# Patient Record
Sex: FEMALE | Race: WHITE | NOT HISPANIC OR LATINO | Employment: FULL TIME | URBAN - NONMETROPOLITAN AREA
[De-identification: names, ages, dates, MRNs, and addresses within clinical notes are randomized per-mention and may not be internally consistent; named-entity substitution may affect disease eponyms.]

---

## 2017-10-05 ENCOUNTER — OFFICE VISIT (OUTPATIENT)
Dept: PSYCHIATRY | Facility: CLINIC | Age: 52
End: 2017-10-05

## 2017-10-05 ENCOUNTER — TELEPHONE (OUTPATIENT)
Dept: PSYCHIATRY | Facility: CLINIC | Age: 52
End: 2017-10-05

## 2017-10-05 VITALS
HEART RATE: 103 BPM | DIASTOLIC BLOOD PRESSURE: 86 MMHG | BODY MASS INDEX: 19.97 KG/M2 | HEIGHT: 64 IN | SYSTOLIC BLOOD PRESSURE: 154 MMHG | WEIGHT: 117 LBS

## 2017-10-05 DIAGNOSIS — F33.0 MILD EPISODE OF RECURRENT MAJOR DEPRESSIVE DISORDER (HCC): ICD-10-CM

## 2017-10-05 DIAGNOSIS — F41.1 GENERALIZED ANXIETY DISORDER: Primary | ICD-10-CM

## 2017-10-05 DIAGNOSIS — F43.10 POST TRAUMATIC STRESS DISORDER (PTSD): Chronic | ICD-10-CM

## 2017-10-05 DIAGNOSIS — Z79.899 MEDICATION MANAGEMENT: ICD-10-CM

## 2017-10-05 LAB
AMPHETAMINE CUT-OFF: NORMAL
BENZODIAZIPINE CUT-OFF: NORMAL
BUPRENORPHINE CUT-OFF: NORMAL
COCAINE CUT-OFF: NORMAL
EXTERNAL AMPHETAMINE SCREEN URINE: NEGATIVE
EXTERNAL BENZODIAZEPINE SCREEN URINE: NEGATIVE
EXTERNAL BUPRENORPHINE SCREEN URINE: NEGATIVE
EXTERNAL COCAINE SCREEN URINE: NEGATIVE
EXTERNAL MDMA: NEGATIVE
EXTERNAL METHADONE SCREEN URINE: NEGATIVE
EXTERNAL METHAMPHETAMINE SCREEN URINE: NEGATIVE
EXTERNAL OPIATES SCREEN URINE: NEGATIVE
EXTERNAL OXYCODONE SCREEN URINE: NEGATIVE
EXTERNAL THC SCREEN URINE: NEGATIVE
MDMA CUT-OFF: NORMAL
METHADONE CUT-OFF: NORMAL
METHAMPHETAMINE CUT-OFF: NORMAL
OPIATES CUT-OFF: NORMAL
OXYCODONE CUT-OFF: NORMAL
THC CUT-OFF: NORMAL

## 2017-10-05 PROCEDURE — 90792 PSYCH DIAG EVAL W/MED SRVCS: CPT | Performed by: NURSE PRACTITIONER

## 2017-10-05 RX ORDER — GABAPENTIN 300 MG/1
300 CAPSULE ORAL 2 TIMES DAILY
Status: ON HOLD | COMMUNITY
End: 2019-12-29

## 2017-10-05 RX ORDER — MIRTAZAPINE 7.5 MG/1
TABLET, FILM COATED ORAL
Qty: 30 TABLET | Refills: 0 | Status: SHIPPED | OUTPATIENT
Start: 2017-10-05 | End: 2017-10-26 | Stop reason: SDUPTHER

## 2017-10-05 RX ORDER — TRAZODONE HYDROCHLORIDE 150 MG/1
150 TABLET ORAL NIGHTLY
COMMUNITY
End: 2017-10-05 | Stop reason: ALTCHOICE

## 2017-10-05 RX ORDER — OMEPRAZOLE 20 MG/1
20 CAPSULE, DELAYED RELEASE ORAL DAILY
Status: ON HOLD | COMMUNITY
End: 2019-12-29

## 2017-10-05 RX ORDER — CLONAZEPAM 0.5 MG/1
0.5 TABLET ORAL 2 TIMES DAILY
Qty: 60 TABLET | Refills: 0 | Status: SHIPPED | OUTPATIENT
Start: 2017-10-05 | End: 2017-11-02 | Stop reason: SDUPTHER
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
## 2017-10-26 ENCOUNTER — OFFICE VISIT (OUTPATIENT)
Dept: PSYCHIATRY | Facility: CLINIC | Age: 52
End: 2017-10-26

## 2017-10-26 VITALS
HEIGHT: 64 IN | WEIGHT: 121 LBS | HEART RATE: 82 BPM | DIASTOLIC BLOOD PRESSURE: 92 MMHG | BODY MASS INDEX: 20.66 KG/M2 | SYSTOLIC BLOOD PRESSURE: 165 MMHG

## 2017-10-26 DIAGNOSIS — F41.1 GENERALIZED ANXIETY DISORDER: ICD-10-CM

## 2017-10-26 DIAGNOSIS — F43.10 POST TRAUMATIC STRESS DISORDER (PTSD): Chronic | ICD-10-CM

## 2017-10-26 DIAGNOSIS — F33.0 MILD EPISODE OF RECURRENT MAJOR DEPRESSIVE DISORDER (HCC): ICD-10-CM

## 2017-10-26 PROCEDURE — 99213 OFFICE O/P EST LOW 20 MIN: CPT | Performed by: NURSE PRACTITIONER

## 2017-10-26 RX ORDER — MIRTAZAPINE 7.5 MG/1
TABLET, FILM COATED ORAL
Qty: 30 TABLET | Refills: 1 | Status: SHIPPED | OUTPATIENT
Start: 2017-10-26 | End: 2017-12-21 | Stop reason: SDUPTHER

## 2017-10-26 RX ORDER — RANITIDINE 150 MG/1
150 TABLET ORAL NIGHTLY
Status: ON HOLD | COMMUNITY
End: 2019-12-29

## 2017-10-26 RX ORDER — SERTRALINE HYDROCHLORIDE 100 MG/1
100 TABLET, FILM COATED ORAL NIGHTLY
Qty: 30 TABLET | Refills: 0 | Status: SHIPPED | OUTPATIENT
Start: 2017-10-26 | End: 2017-11-27 | Stop reason: SDUPTHER

## 2017-10-26 NOTE — PROGRESS NOTES
"      Subjective   Kassandra Acevedo is a 52 y.o. female is here today for medication management follow-up.    Chief Complaint: I'm doing alright      History of Present Illness     She shares the klonopin has been helpful. She states she tried to drive to [place], [place] but couldn't get past [facility]. She states she is still shaking when driving unless it is in close proximity to her house. She doesn't like driving on the interstate. Her anxiety has improved. She has had about 3 panic attacks that were minor in significance being able to calm down after about 15 minutes.  Her  just relocated to [place] for his work but she is unable to go with as her son is in school and he is living with 4 other railroaders in [place] sharing rent. She states she doesn't do much when  isn't around and will isolate/withdraw at home due to anxiety she is worried she will experience out in public. She is however going to take her 10yo son trick-or-treating. When they went looking for his costume she had a panic attack as there were too many people in the store, she started sweating, heart beating out of chest. The remeron is helping with her sleep, but is still waking up nightly about 1 and 3 am easily able to fall back asleep. She states she is waking up rested in the morning. She feels the zoloft is helping her depression. Depressive symptoms are currently revolving around  relocating to [place]. She has been having daily episodes of crying when she thinks of her  being gone. She denies HHW. She still has minimal energy and motivation. She has a lot more responsibility when her  isn't home \"I have a lot on my shoulders.\" Her appetite is good with minimal weight increase. Overall the meds are helping decrease symptoms without SEs. She described improvements such as coping with certain situations. She denies any new medical complaints. No other stressors to note at this time.    The " "following portions of the patient's history were reviewed and updated as appropriate: allergies, current medications, past family history, past medical history, past social history, past surgical history and problem list.    Review of Systems   Constitutional: Negative for activity change, appetite change and fatigue.   HENT: Negative.    Eyes: Negative for visual disturbance.   Respiratory: Negative.    Cardiovascular: Negative.    Gastrointestinal: Negative for nausea.   Endocrine: Negative.    Genitourinary: Negative.    Musculoskeletal: Negative for arthralgias.   Skin: Negative.    Allergic/Immunologic: Negative.    Neurological: Negative for dizziness, seizures and headaches.   Hematological: Negative.    Psychiatric/Behavioral: Negative for agitation, behavioral problems, confusion, decreased concentration, dysphoric mood, hallucinations, self-injury, sleep disturbance and suicidal ideas. The patient is nervous/anxious. The patient is not hyperactive.        Objective   Physical Exam   Constitutional: She is oriented to person, place, and time. She appears well-developed and well-nourished. No distress.   Neurological: She is alert and oriented to person, place, and time.   Skin: She is not diaphoretic.   Psychiatric: She has a normal mood and affect. Her behavior is normal. Thought content normal.   Vitals reviewed.    Blood pressure 165/92, pulse 82, height 64\" (162.6 cm), weight 121 lb (54.9 kg).    Medication List:   Current Outpatient Prescriptions   Medication Sig Dispense Refill   • clonazePAM (KLONOPIN) 0.5 MG tablet Take 1 tablet by mouth 2 (Two) Times a Day. Please call in 60 tablet 0   • gabapentin (NEURONTIN) 300 MG capsule Take 300 mg by mouth 2 (Two) Times a Day.     • mirtazapine (REMERON) 7.5 MG tablet Take 1 tablet by mouth as needed at bedtime for sleep 30 tablet 1   • omeprazole (priLOSEC) 20 MG capsule Take 20 mg by mouth Daily.     • raNITIdine (ZANTAC) 150 MG tablet Take 150 mg by mouth " Every Night.     • sertraline (ZOLOFT) 100 MG tablet Take 1 tablet by mouth Every Night. 30 tablet 0     No current facility-administered medications for this visit.        Mental Status Exam:   Hygiene:   good  Cooperation:  Cooperative  Eye Contact:  Good  Psychomotor Behavior:  Appropriate  Affect:  Full range  Hopelessness: Denies  Speech:  Normal  Thought Process:  Goal directed and Linear  Thought Content:  Normal  Suicidal:  None  Homicidal:  None  Hallucinations:  None  Delusion:  None  Memory:  Intact  Orientation:  Person, Place, Time and Situation  Reliability:  fair  Insight:  Fair  Judgement:  Fair  Impulse Control:  Fair  Physical/Medical Issues:  No     Assessment/Plan   Diagnoses and all orders for this visit:    Generalized anxiety disorder  -     sertraline (ZOLOFT) 100 MG tablet; Take 1 tablet by mouth Every Night.  -     mirtazapine (REMERON) 7.5 MG tablet; Take 1 tablet by mouth as needed at bedtime for sleep    Post traumatic stress disorder (PTSD)  -     sertraline (ZOLOFT) 100 MG tablet; Take 1 tablet by mouth Every Night.  -     mirtazapine (REMERON) 7.5 MG tablet; Take 1 tablet by mouth as needed at bedtime for sleep    Mild episode of recurrent major depressive disorder  -     sertraline (ZOLOFT) 100 MG tablet; Take 1 tablet by mouth Every Night.  -     mirtazapine (REMERON) 7.5 MG tablet; Take 1 tablet by mouth as needed at bedtime for sleep      Pt still has ongoing anxiety and depressive symptoms which are causing impairments in important areas of functioning as described in HPI. Discussed medication options. Continue with remeron and increase zoloft to 100mg daily for ongoing symptoms of anxiety and depression. Reviewed the risks, benefits, and side effects of the medications; patient acknowledged and verbally consented. She will also go to psychotherapy with Cherelle.  Patient is agreeable to call the [facility].  Patient is aware to call 911 or go to the nearest ER should begin  having SI/HI.        RTC 4 weeks

## 2017-10-30 ENCOUNTER — OFFICE VISIT (OUTPATIENT)
Dept: PSYCHIATRY | Facility: CLINIC | Age: 52
End: 2017-10-30

## 2017-10-30 DIAGNOSIS — F33.0 MILD EPISODE OF RECURRENT MAJOR DEPRESSIVE DISORDER (HCC): ICD-10-CM

## 2017-10-30 DIAGNOSIS — F43.10 POST TRAUMATIC STRESS DISORDER (PTSD): ICD-10-CM

## 2017-10-30 DIAGNOSIS — F41.1 GENERALIZED ANXIETY DISORDER: Primary | ICD-10-CM

## 2017-10-30 PROCEDURE — 90834 PSYTX W PT 45 MINUTES: CPT | Performed by: SOCIAL WORKER
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
## 2017-11-02 DIAGNOSIS — F41.1 GENERALIZED ANXIETY DISORDER: ICD-10-CM

## 2017-11-02 DIAGNOSIS — F43.10 POST TRAUMATIC STRESS DISORDER (PTSD): Chronic | ICD-10-CM

## 2017-11-02 RX ORDER — CLONAZEPAM 0.5 MG/1
0.5 TABLET ORAL 2 TIMES DAILY
Qty: 60 TABLET | Refills: 0 | Status: SHIPPED | OUTPATIENT
Start: 2017-11-02 | End: 2017-12-04 | Stop reason: SDUPTHER

## 2017-11-02 NOTE — TELEPHONE ENCOUNTER
Pt request RX refill. Will be out of medication this weekend.  0-refills on file.  Next appt.is 11/27/17

## 2017-11-03 ENCOUNTER — TELEPHONE (OUTPATIENT)
Dept: PSYCHIATRY | Facility: CLINIC | Age: 52
End: 2017-11-03

## 2017-11-13 ENCOUNTER — OFFICE VISIT (OUTPATIENT)
Dept: PSYCHIATRY | Facility: CLINIC | Age: 52
End: 2017-11-13

## 2017-11-13 DIAGNOSIS — F43.10 POST TRAUMATIC STRESS DISORDER (PTSD): ICD-10-CM

## 2017-11-13 DIAGNOSIS — F41.1 GENERALIZED ANXIETY DISORDER: Primary | ICD-10-CM

## 2017-11-13 DIAGNOSIS — F33.0 MILD EPISODE OF RECURRENT MAJOR DEPRESSIVE DISORDER (HCC): ICD-10-CM

## 2017-11-13 PROCEDURE — 90834 PSYTX W PT 45 MINUTES: CPT | Performed by: SOCIAL WORKER
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
## 2017-11-27 ENCOUNTER — OFFICE VISIT (OUTPATIENT)
Dept: PSYCHIATRY | Facility: CLINIC | Age: 52
End: 2017-11-27

## 2017-11-27 VITALS
WEIGHT: 123 LBS | HEART RATE: 98 BPM | BODY MASS INDEX: 21 KG/M2 | SYSTOLIC BLOOD PRESSURE: 127 MMHG | HEIGHT: 64 IN | DIASTOLIC BLOOD PRESSURE: 79 MMHG

## 2017-11-27 DIAGNOSIS — F43.10 POST TRAUMATIC STRESS DISORDER (PTSD): Chronic | ICD-10-CM

## 2017-11-27 DIAGNOSIS — F33.0 MILD EPISODE OF RECURRENT MAJOR DEPRESSIVE DISORDER (HCC): ICD-10-CM

## 2017-11-27 DIAGNOSIS — F41.1 GENERALIZED ANXIETY DISORDER: ICD-10-CM

## 2017-11-27 PROCEDURE — 99213 OFFICE O/P EST LOW 20 MIN: CPT | Performed by: NURSE PRACTITIONER

## 2017-11-27 RX ORDER — SERTRALINE HYDROCHLORIDE 100 MG/1
150 TABLET, FILM COATED ORAL NIGHTLY
Qty: 45 TABLET | Refills: 1 | Status: SHIPPED | OUTPATIENT
Start: 2017-11-27 | End: 2017-12-21 | Stop reason: SDUPTHER

## 2017-11-27 NOTE — PROGRESS NOTES
"      Subjective   Kassandra Acevedo is a 52 y.o. female is here today for medication management follow-up.    Chief Complaint: I'm doing alright      History of Present Illness   She shares the remeron has been very helpful with her sleep. She continues to have anxiety when she drives. There has been 2 incidences of attempting to drive to [place], [place] but couldn't get past [place], [place]. She is suppose to drive to [place] in December to visit her mother, but she is extremely anxious about this. She is stressing herself out worrying about the upcoming trip. She is taking her 10yo son with her but regardless she states its the drivers around her that make her anxious. She is observed tremorous. Rates anxiety 8 with 10 worst \"thinking about the drive home.\" She states even if her phone rings or to get a drink of pop she will not take her eyes off the road. Denies any side effects from medications and feels they continue to help. No new medical complaints at this time. States her  continues to work in [place], he wasn't home for [facility] and he dislikes having to work in [place], leaving her and son to go eat at [employer], which she states is depressing. She brought her klonopin with her today and showed that she is compliant with her medications. Denies SI/HI/AH/VH. She continues to worry excessively. Reports anxiety outweighs the depression. She still feels lack of motivation/energy.         The following portions of the patient's history were reviewed and updated as appropriate: allergies, current medications, past family history, past medical history, past social history, past surgical history and problem list.    Review of Systems   Constitutional: Negative for activity change, appetite change and fatigue.   HENT: Negative.    Eyes: Negative for visual disturbance.   Respiratory: Negative.    Cardiovascular: Negative.    Gastrointestinal: Negative for nausea.   Endocrine: Negative.  " "  Genitourinary: Negative.    Musculoskeletal: Negative for arthralgias.   Skin: Negative.    Allergic/Immunologic: Negative.    Neurological: Negative for dizziness, seizures and headaches.   Hematological: Negative.    Psychiatric/Behavioral: Negative for agitation, behavioral problems, confusion, decreased concentration, dysphoric mood, hallucinations, self-injury, sleep disturbance and suicidal ideas. The patient is nervous/anxious. The patient is not hyperactive.        Objective   Physical Exam   Constitutional: She is oriented to person, place, and time. She appears well-developed and well-nourished. No distress.   Neurological: She is alert and oriented to person, place, and time.   Skin: She is not diaphoretic.   Psychiatric: She has a normal mood and affect. Her behavior is normal. Thought content normal.   Vitals reviewed.    Blood pressure 127/79, pulse 98, height 64\" (162.6 cm), weight 123 lb (55.8 kg).    Medication List:   Current Outpatient Prescriptions   Medication Sig Dispense Refill   • clonazePAM (KLONOPIN) 0.5 MG tablet Take 1 tablet by mouth 2 (Two) Times a Day. Please call in 60 tablet 0   • gabapentin (NEURONTIN) 300 MG capsule Take 300 mg by mouth 2 (Two) Times a Day.     • mirtazapine (REMERON) 7.5 MG tablet Take 1 tablet by mouth as needed at bedtime for sleep 30 tablet 1   • omeprazole (priLOSEC) 20 MG capsule Take 20 mg by mouth Daily.     • raNITIdine (ZANTAC) 150 MG tablet Take 150 mg by mouth Every Night.     • sertraline (ZOLOFT) 100 MG tablet Take 1.5 tablets by mouth Every Night. 45 tablet 1     No current facility-administered medications for this visit.        Mental Status Exam:   Hygiene:   good  Cooperation:  Cooperative  Eye Contact:  Good  Psychomotor Behavior:  Appropriate  Affect:  Full range  Hopelessness: Denies  Speech:  Normal  Thought Process:  Goal directed and Linear  Thought Content:  Normal  Suicidal:  None  Homicidal:  None  Hallucinations:  None  Delusion:  " None  Memory:  Intact  Orientation:  Person, Place, Time and Situation  Reliability:  fair  Insight:  Fair  Judgement:  Fair  Impulse Control:  Fair  Physical/Medical Issues:  No     Assessment/Plan   Diagnoses and all orders for this visit:    Generalized anxiety disorder  -     sertraline (ZOLOFT) 100 MG tablet; Take 1.5 tablets by mouth Every Night.    Post traumatic stress disorder (PTSD)  -     sertraline (ZOLOFT) 100 MG tablet; Take 1.5 tablets by mouth Every Night.    Mild episode of recurrent major depressive disorder  -     sertraline (ZOLOFT) 100 MG tablet; Take 1.5 tablets by mouth Every Night.      Pt still has ongoing anxiety and depressive symptoms which are causing impairments in important areas of functioning as described in HPI. Discussed medication options. Continue with remeron and increase zoloft to 150mg daily for ongoing symptoms of anxiety and depression. Reviewed the risks, benefits, and side effects of the medications; patient acknowledged and verbally consented. She will also go to psychotherapy with Cherelle to help with coping especially to help calm herself when driving.  Patient is agreeable to call the [facility].  Patient is aware to call 911 or go to the nearest ER should begin having SI/HI.        RTC 4 weeks

## 2017-11-28 ENCOUNTER — OFFICE VISIT (OUTPATIENT)
Dept: PSYCHIATRY | Facility: CLINIC | Age: 52
End: 2017-11-28

## 2017-11-28 DIAGNOSIS — Z79.899 MEDICATION MANAGEMENT: ICD-10-CM

## 2017-11-28 DIAGNOSIS — F33.0 MILD EPISODE OF RECURRENT MAJOR DEPRESSIVE DISORDER (HCC): ICD-10-CM

## 2017-11-28 DIAGNOSIS — F41.1 GENERALIZED ANXIETY DISORDER: Primary | ICD-10-CM

## 2017-11-28 DIAGNOSIS — F43.10 POST TRAUMATIC STRESS DISORDER (PTSD): ICD-10-CM

## 2017-11-28 PROCEDURE — 90834 PSYTX W PT 45 MINUTES: CPT | Performed by: SOCIAL WORKER
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
## 2017-11-28 NOTE — TREATMENT PLAN
Multi-Disciplinary Problems (from Behavioral Health Treatment Plan)    Active Problems     Problem: Anxiety (Priority: --)  (Start Date: 11/28/17) (Resolve Date: --)    Problem Details:  The patient self-scales this problem as a 8 with 10 being the worst.         Goal Start Date End Date    Patient will develop and implement behavioral and cognitive strategies to reduce anxiety and irrational fears. 11/28/17 --    Goal Details:  Progress toward goal:  Not appropriate to rate progress toward goal since this is the initial treatment plan.         Goal Intervention Frequency Start Date End Date    Help patient explore past emotional issues in relation to present anxiety. Q2 Weeks 11/28/17 --    Intervention Details:  Duration of treatment until until remission of symptoms.         Goal Intervention Frequency Start Date End Date    Help patient develop an awareness of their cognitive and physical responses to anxiety. Q2 Weeks 11/28/17 --    Intervention Details:  Duration of treatment until until remission of symptoms.               Problem: Depression (Priority: --)  (Start Date: 11/28/17) (Resolve Date: --)    Problem Details:  The patient self-scales this problem as a 7 with 10 being the worst.         Goal Start Date End Date    Patient will demonstrate the ability to initiate new constructive life skills outside of sessions on a consistent basis. 11/28/17 --    Goal Details:  Progress toward goal:  Not appropriate to rate progress toward goal since this is the initial treatment plan.         Goal Intervention Frequency Start Date End Date    Assist patient in setting attainable activities of daily living goals. Q2 Weeks 11/28/17 --    Goal Intervention Frequency Start Date End Date    Provide education about depression Q2 Weeks 11/28/17 --    Intervention Details:  Duration of treatment until until remission of symptoms.         Goal Intervention Frequency Start Date End Date    Assist patient in developing  healthy coping strategies. Q2 Weeks 11/28/17 --    Intervention Details:  Duration of treatment until until remission of symptoms.               Problem: Post Traumatic Stress (Priority: --)  (Start Date: 11/28/17) (Resolve Date: --)    Problem Details:  The patient self-scales this problem as a 9 with 10 being the worst.         Goal Start Date End Date    Patient will process and move through trauma in a way that improves self regard and the patients ability to function optimally in the world around them. 11/28/17 --    Goal Details:  Progress toward goal:  Not appropriate to rate progress toward goal since this is the initial treatment plan.         Goal Intervention Frequency Start Date End Date    Assist patient in identifying ways that trauma has negatively impacted their view of themselves and the world. Q2 Weeks 11/28/17 --    Intervention Details:  Duration of treatment until until remission of symptoms.         Goal Intervention Frequency Start Date End Date    Process trauma in the context of the safe session environment. Q2 Weeks 11/28/17 --    Intervention Details:  Duration of treatment until until remission of symptoms.         Goal Intervention Frequency Start Date End Date    Develop a plan of behavior changes that will reduce the stress of the trauma. Q2 Weeks 11/28/17 --    Intervention Details:  Duration of treatment until until remission of symptoms.                     Reviewed By     Cherelle Singh LCSW 11/28/17 1013                 I have discussed and reviewed this treatment plan with the patient.  It has been printed for signatures.

## 2017-12-04 ENCOUNTER — TELEPHONE (OUTPATIENT)
Dept: PSYCHIATRY | Facility: CLINIC | Age: 52
End: 2017-12-04

## 2017-12-04 DIAGNOSIS — F41.1 GENERALIZED ANXIETY DISORDER: ICD-10-CM

## 2017-12-04 DIAGNOSIS — F43.10 POST TRAUMATIC STRESS DISORDER (PTSD): Chronic | ICD-10-CM

## 2017-12-04 RX ORDER — CLONAZEPAM 0.5 MG/1
0.5 TABLET ORAL 2 TIMES DAILY
Qty: 60 TABLET | Refills: 0 | Status: SHIPPED | OUTPATIENT
Start: 2017-12-04 | End: 2017-12-21 | Stop reason: SDUPTHER

## 2017-12-05 ENCOUNTER — TELEPHONE (OUTPATIENT)
Dept: PSYCHIATRY | Facility: CLINIC | Age: 52
End: 2017-12-05

## 2017-12-12 ENCOUNTER — OFFICE VISIT (OUTPATIENT)
Dept: PSYCHIATRY | Facility: CLINIC | Age: 52
End: 2017-12-12

## 2017-12-12 DIAGNOSIS — F43.10 POST TRAUMATIC STRESS DISORDER (PTSD): ICD-10-CM

## 2017-12-12 DIAGNOSIS — F33.0 MILD EPISODE OF RECURRENT MAJOR DEPRESSIVE DISORDER (HCC): ICD-10-CM

## 2017-12-12 DIAGNOSIS — F41.1 GENERALIZED ANXIETY DISORDER: Primary | ICD-10-CM

## 2017-12-12 PROCEDURE — 90834 PSYTX W PT 45 MINUTES: CPT | Performed by: SOCIAL WORKER
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
## 2017-12-21 ENCOUNTER — OFFICE VISIT (OUTPATIENT)
Dept: PSYCHIATRY | Facility: CLINIC | Age: 52
End: 2017-12-21

## 2017-12-21 VITALS
HEIGHT: 64 IN | HEART RATE: 107 BPM | DIASTOLIC BLOOD PRESSURE: 88 MMHG | SYSTOLIC BLOOD PRESSURE: 145 MMHG | WEIGHT: 123 LBS | BODY MASS INDEX: 21 KG/M2

## 2017-12-21 DIAGNOSIS — F33.0 MILD EPISODE OF RECURRENT MAJOR DEPRESSIVE DISORDER (HCC): ICD-10-CM

## 2017-12-21 DIAGNOSIS — F43.10 POST TRAUMATIC STRESS DISORDER (PTSD): ICD-10-CM

## 2017-12-21 DIAGNOSIS — F41.1 GENERALIZED ANXIETY DISORDER: Primary | ICD-10-CM

## 2017-12-21 PROCEDURE — 99214 OFFICE O/P EST MOD 30 MIN: CPT | Performed by: NURSE PRACTITIONER

## 2017-12-21 RX ORDER — CLONAZEPAM 1 MG/1
.5-1 TABLET ORAL 2 TIMES DAILY PRN
Qty: 60 TABLET | Refills: 0 | Status: SHIPPED | OUTPATIENT
Start: 2017-12-21 | End: 2018-01-29 | Stop reason: SDUPTHER

## 2017-12-21 RX ORDER — SERTRALINE HYDROCHLORIDE 100 MG/1
200 TABLET, FILM COATED ORAL NIGHTLY
Qty: 60 TABLET | Refills: 1 | Status: SHIPPED | OUTPATIENT
Start: 2017-12-21 | End: 2018-01-29 | Stop reason: SDUPTHER

## 2017-12-21 RX ORDER — MIRTAZAPINE 7.5 MG/1
TABLET, FILM COATED ORAL
Qty: 30 TABLET | Refills: 1 | Status: SHIPPED | OUTPATIENT
Start: 2017-12-21 | End: 2018-01-29 | Stop reason: SDUPTHER

## 2017-12-21 NOTE — PROGRESS NOTES
"      Subjective   Kassandra Acevedo is a 52 y.o. female is here today for medication management follow-up.    Chief Complaint: I'm doing alright      History of Present Illness She comes requesting increasing meds due to severe stress and worry over driving.  She shares the remeron has been helpful with her sleep. She continues to have anxiety when she drives. There has been 2 incidences of attempting to drive to [place], [place] but couldn't get past [place], [place]. She is extremely anxious about everying especially  She is stressing herself out worrying about the upcoming trip. She is taking her 12yo son with her but regardless. She is observed tremorous. Rates anxiety 8 with 10 worst \"thinking about the drive home.\" Denies any side effects from medications and feels they continue to help. No new medical complaints at this time. States her  continues to work in [place], he wasn't home for [facility] and he dislikes having to work in [place].   She brought her klonopin with her today-pill count was complete and showed that she is compliant with her medications. Denies SI/HI/AH/VH. She continues to worry excessively. Reports anxiety outweighs the depression. S    The following portions of the patient's history were reviewed and updated as appropriate: allergies, current medications, past family history, past medical history, past social history, past surgical history and problem list.    Review of Systems   Constitutional: Positive for activity change. Negative for appetite change and fatigue.   HENT: Negative.    Eyes: Negative for visual disturbance.   Respiratory: Negative.    Cardiovascular: Negative.    Gastrointestinal: Negative for nausea.   Endocrine: Negative.    Genitourinary: Negative.    Musculoskeletal: Negative for arthralgias.   Skin: Negative.    Allergic/Immunologic: Negative.    Neurological: Negative for dizziness, seizures and headaches.   Hematological: Negative.  " "  Psychiatric/Behavioral: Negative for agitation, behavioral problems, confusion, decreased concentration, dysphoric mood, hallucinations, self-injury, sleep disturbance and suicidal ideas. The patient is nervous/anxious. The patient is not hyperactive.        Objective   Physical Exam   Constitutional: She is oriented to person, place, and time. She appears well-developed and well-nourished. No distress.   Neurological: She is alert and oriented to person, place, and time.   Skin: She is not diaphoretic.   Psychiatric: She has a normal mood and affect. Her behavior is normal. Thought content normal.   Vitals reviewed.    Blood pressure 145/88, pulse 107, height 162.6 cm (64\"), weight 55.8 kg (123 lb).    Medication List:   Current Outpatient Prescriptions   Medication Sig Dispense Refill   • clonazePAM (KLONOPIN) 0.5 MG tablet Take 1 tablet by mouth 2 (Two) Times a Day. Please call in 60 tablet 0   • gabapentin (NEURONTIN) 300 MG capsule Take 300 mg by mouth 2 (Two) Times a Day.     • mirtazapine (REMERON) 7.5 MG tablet Take 1 tablet by mouth as needed at bedtime for sleep 30 tablet 1   • omeprazole (priLOSEC) 20 MG capsule Take 20 mg by mouth Daily.     • raNITIdine (ZANTAC) 150 MG tablet Take 150 mg by mouth Every Night.     • sertraline (ZOLOFT) 100 MG tablet Take 1.5 tablets by mouth Every Night. 45 tablet 1     No current facility-administered medications for this visit.        Mental Status Exam:   Hygiene:   good  Cooperation:  Cooperative  Eye Contact:  Good  Psychomotor Behavior:  Appropriate  Affect:  Full range  Hopelessness: Denies  Speech:  Normal  Thought Process:  Goal directed and Linear  Thought Content:  Normal  Suicidal:  None  Homicidal:  None  Hallucinations:  None  Delusion:  None  Memory:  Intact  Orientation:  Person, Place, Time and Situation  Reliability:  fair  Insight:  Fair  Judgement:  Fair  Impulse Control:  Fair  Physical/Medical Issues:  No     Assessment/Plan   Diagnoses and all " orders for this visit:    Generalized anxiety disorder  -     sertraline (ZOLOFT) 100 MG tablet; Take 2 tablets by mouth Every Night.  -     mirtazapine (REMERON) 7.5 MG tablet; Take 1 tablet by mouth as needed at bedtime for sleep  -     clonazePAM (KlonoPIN) 1 MG tablet; Take 0.5-1 tablets by mouth 2 (Two) Times a Day As Needed for Anxiety. Please call in    Post traumatic stress disorder (PTSD)  -     sertraline (ZOLOFT) 100 MG tablet; Take 2 tablets by mouth Every Night.  -     mirtazapine (REMERON) 7.5 MG tablet; Take 1 tablet by mouth as needed at bedtime for sleep  -     clonazePAM (KlonoPIN) 1 MG tablet; Take 0.5-1 tablets by mouth 2 (Two) Times a Day As Needed for Anxiety. Please call in    Mild episode of recurrent major depressive disorder  -     sertraline (ZOLOFT) 100 MG tablet; Take 2 tablets by mouth Every Night.  -     mirtazapine (REMERON) 7.5 MG tablet; Take 1 tablet by mouth as needed at bedtime for sleep    Other orders  -     Melatonin 3 MG capsule; Take 3 mg by mouth Every Night.      Pt still has ongoing anxiety and depressive symptoms which are causing impairments in important areas of functioning as described in HPI. Discussed medication options. Continue with remeron and increase zoloft to 200mg daily for ongoing symptoms of anxiety and depression. Reviewed the risks, benefits, and side effects of the medications; patient acknowledged and verbally consented. She will also go to psychotherapy with Cherelle to help with coping especially to help calm herself when driving.  Patient is agreeable to call the [facility].  Patient is aware to call 911 or go to the nearest ER should begin having SI/HI.        RTC 4 weeks

## 2018-01-09 ENCOUNTER — OFFICE VISIT (OUTPATIENT)
Dept: PSYCHIATRY | Facility: CLINIC | Age: 53
End: 2018-01-09

## 2018-01-09 DIAGNOSIS — F41.1 GENERALIZED ANXIETY DISORDER: Primary | ICD-10-CM

## 2018-01-09 DIAGNOSIS — F43.10 POST TRAUMATIC STRESS DISORDER (PTSD): ICD-10-CM

## 2018-01-09 DIAGNOSIS — F33.0 MILD EPISODE OF RECURRENT MAJOR DEPRESSIVE DISORDER (HCC): ICD-10-CM

## 2018-01-09 PROCEDURE — 90834 PSYTX W PT 45 MINUTES: CPT | Performed by: SOCIAL WORKER
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
## 2018-01-10 ENCOUNTER — TELEPHONE (OUTPATIENT)
Dept: PSYCHIATRY | Facility: CLINIC | Age: 53
End: 2018-01-10

## 2018-01-10 NOTE — TELEPHONE ENCOUNTER
Patient has called requesting if you can call her back she sounded upset but would not speak to me about what was going on
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
## 2018-01-29 ENCOUNTER — OFFICE VISIT (OUTPATIENT)
Dept: PSYCHIATRY | Facility: CLINIC | Age: 53
End: 2018-01-29

## 2018-01-29 ENCOUNTER — TELEPHONE (OUTPATIENT)
Dept: PSYCHIATRY | Facility: CLINIC | Age: 53
End: 2018-01-29

## 2018-01-29 VITALS
HEIGHT: 64 IN | HEART RATE: 92 BPM | DIASTOLIC BLOOD PRESSURE: 78 MMHG | BODY MASS INDEX: 21.85 KG/M2 | WEIGHT: 128 LBS | SYSTOLIC BLOOD PRESSURE: 130 MMHG

## 2018-01-29 DIAGNOSIS — F33.0 MILD EPISODE OF RECURRENT MAJOR DEPRESSIVE DISORDER (HCC): ICD-10-CM

## 2018-01-29 DIAGNOSIS — F43.10 POST TRAUMATIC STRESS DISORDER (PTSD): ICD-10-CM

## 2018-01-29 DIAGNOSIS — F41.1 GENERALIZED ANXIETY DISORDER: ICD-10-CM

## 2018-01-29 PROCEDURE — 99213 OFFICE O/P EST LOW 20 MIN: CPT | Performed by: NURSE PRACTITIONER

## 2018-01-29 RX ORDER — SERTRALINE HYDROCHLORIDE 100 MG/1
200 TABLET, FILM COATED ORAL NIGHTLY
Qty: 60 TABLET | Refills: 1 | Status: SHIPPED | OUTPATIENT
Start: 2018-01-29 | End: 2018-03-19 | Stop reason: SDUPTHER

## 2018-01-29 RX ORDER — CLONAZEPAM 1 MG/1
.5-1 TABLET ORAL 2 TIMES DAILY PRN
Qty: 60 TABLET | Refills: 0 | Status: SHIPPED | OUTPATIENT
Start: 2018-01-29 | End: 2018-03-19 | Stop reason: SDUPTHER

## 2018-01-29 RX ORDER — PRAZOSIN HYDROCHLORIDE 1 MG/1
1 CAPSULE ORAL NIGHTLY
Qty: 30 CAPSULE | Refills: 1 | Status: SHIPPED | OUTPATIENT
Start: 2018-01-29 | End: 2018-03-19 | Stop reason: SDUPTHER

## 2018-01-29 RX ORDER — MIRTAZAPINE 7.5 MG/1
TABLET, FILM COATED ORAL
Qty: 30 TABLET | Refills: 1 | Status: SHIPPED | OUTPATIENT
Start: 2018-01-29 | End: 2018-03-19 | Stop reason: SDUPTHER

## 2018-01-29 NOTE — TELEPHONE ENCOUNTER
Called in Klonopin 1 MG #60 0 refill in to [facility] in [place]
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
## 2018-02-06 ENCOUNTER — OFFICE VISIT (OUTPATIENT)
Dept: PSYCHIATRY | Facility: CLINIC | Age: 53
End: 2018-02-06

## 2018-02-06 ENCOUNTER — TELEPHONE (OUTPATIENT)
Dept: PSYCHIATRY | Facility: CLINIC | Age: 53
End: 2018-02-06

## 2018-02-06 DIAGNOSIS — F33.0 MILD EPISODE OF RECURRENT MAJOR DEPRESSIVE DISORDER (HCC): ICD-10-CM

## 2018-02-06 DIAGNOSIS — F41.1 GENERALIZED ANXIETY DISORDER: Primary | ICD-10-CM

## 2018-02-06 DIAGNOSIS — F43.10 POST TRAUMATIC STRESS DISORDER (PTSD): ICD-10-CM

## 2018-02-06 PROCEDURE — 90834 PSYTX W PT 45 MINUTES: CPT | Performed by: SOCIAL WORKER
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
## 2018-03-19 ENCOUNTER — OFFICE VISIT (OUTPATIENT)
Dept: PSYCHIATRY | Facility: CLINIC | Age: 53
End: 2018-03-19

## 2018-03-19 VITALS
HEIGHT: 55 IN | WEIGHT: 130 LBS | DIASTOLIC BLOOD PRESSURE: 72 MMHG | HEART RATE: 88 BPM | BODY MASS INDEX: 30.09 KG/M2 | SYSTOLIC BLOOD PRESSURE: 117 MMHG

## 2018-03-19 DIAGNOSIS — F43.10 POST TRAUMATIC STRESS DISORDER (PTSD): ICD-10-CM

## 2018-03-19 DIAGNOSIS — Z79.899 MEDICATION MANAGEMENT: Primary | ICD-10-CM

## 2018-03-19 DIAGNOSIS — F33.0 MILD EPISODE OF RECURRENT MAJOR DEPRESSIVE DISORDER (HCC): ICD-10-CM

## 2018-03-19 DIAGNOSIS — F41.1 GENERALIZED ANXIETY DISORDER: ICD-10-CM

## 2018-03-19 LAB
AMPHETAMINE CUT-OFF: 1000
BENZODIAZIPINE CUT-OFF: 300
BUPRENORPHINE CUT-OFF: 10
COCAINE CUT-OFF: 300
EXTERNAL AMPHETAMINE SCREEN URINE: NEGATIVE
EXTERNAL BENZODIAZEPINE SCREEN URINE: NEGATIVE
EXTERNAL BUPRENORPHINE SCREEN URINE: NEGATIVE
EXTERNAL COCAINE SCREEN URINE: NEGATIVE
EXTERNAL MDMA: NEGATIVE
EXTERNAL METHADONE SCREEN URINE: NEGATIVE
EXTERNAL METHAMPHETAMINE SCREEN URINE: NEGATIVE
EXTERNAL OPIATES SCREEN URINE: NEGATIVE
EXTERNAL OXYCODONE SCREEN URINE: NEGATIVE
EXTERNAL THC SCREEN URINE: NEGATIVE
MDMA CUT-OFF: 500
METHADONE CUT-OFF: 300
METHAMPHETAMINE CUT-OFF: 1000
OPIATES CUT-OFF: 300
OXYCODONE CUT-OFF: 100
THC CUT-OFF: 50

## 2018-03-19 PROCEDURE — 99213 OFFICE O/P EST LOW 20 MIN: CPT | Performed by: NURSE PRACTITIONER

## 2018-03-19 RX ORDER — PRAZOSIN HYDROCHLORIDE 1 MG/1
1 CAPSULE ORAL NIGHTLY
Qty: 30 CAPSULE | Refills: 1 | Status: SHIPPED | OUTPATIENT
Start: 2018-03-19 | End: 2018-05-07 | Stop reason: SDUPTHER

## 2018-03-19 RX ORDER — MIRTAZAPINE 7.5 MG/1
TABLET, FILM COATED ORAL
Qty: 30 TABLET | Refills: 1 | Status: SHIPPED | OUTPATIENT
Start: 2018-03-19 | End: 2018-05-07 | Stop reason: SDUPTHER

## 2018-03-19 RX ORDER — CLONAZEPAM 1 MG/1
.5-1 TABLET ORAL 2 TIMES DAILY PRN
Qty: 60 TABLET | Refills: 0 | Status: SHIPPED | OUTPATIENT
Start: 2018-03-19 | End: 2018-05-07 | Stop reason: SDDI

## 2018-03-19 RX ORDER — SERTRALINE HYDROCHLORIDE 100 MG/1
200 TABLET, FILM COATED ORAL NIGHTLY
Qty: 60 TABLET | Refills: 1 | Status: SHIPPED | OUTPATIENT
Start: 2018-03-19 | End: 2018-05-07 | Stop reason: SDUPTHER

## 2018-03-19 NOTE — PROGRESS NOTES
"      Subjective   Kassandra Acevedo is a 52 y.o. female is here today for medication management follow-up.    Chief Complaint: I'm doing alright      History of Present Illness   Patient reports she has had some difficulty with adult son.  But she does report improving depression and anxiety.  She reports difficulty with nightmares and up at 5am.  She is having anxiety with driving-has  drive her.  Denies any side effects from medications and feels they continue to help. No new medical complaints at this time. She has allowed adult son to move into the home-\"he's depressed and I worry-he didn't come home last night\". She is having less difficulty with .   Denies SI/HI/AH/VH. She continues to worry excessively. She reports less difficulty with frequently occurring nightmares.  She is reporting depression is improving.She is denying any thoughts to harm self or others.    The patient reports depressive symptoms including depressed mood, crying spells, insomnia, feelings of guilt, feelings of hopelessness, feelings of helplessness, feelings of worthlessness, low energy and psychomotor agitation, and have caused impairment in important areas of functioning.  Depression rated 5/10 with 10 being the worst.       The following portions of the patient's history were reviewed and updated as appropriate: allergies, current medications, past family history, past medical history, past social history, past surgical history and problem list.    Review of Systems   Constitutional: Positive for activity change. Negative for appetite change and fatigue.   HENT: Negative.    Eyes: Negative for visual disturbance.   Respiratory: Negative.    Cardiovascular: Negative.    Gastrointestinal: Negative for nausea.   Endocrine: Negative.    Genitourinary: Negative.    Musculoskeletal: Negative for arthralgias.   Skin: Negative.    Allergic/Immunologic: Negative.    Neurological: Negative for dizziness, seizures and headaches. " "  Hematological: Negative.    Psychiatric/Behavioral: Negative for agitation, behavioral problems, confusion, decreased concentration, dysphoric mood, hallucinations, self-injury, sleep disturbance and suicidal ideas. The patient is nervous/anxious. The patient is not hyperactive.        Objective   Physical Exam   Constitutional: She is oriented to person, place, and time. She appears well-developed and well-nourished. No distress.   Neurological: She is alert and oriented to person, place, and time.   Skin: She is not diaphoretic.   Psychiatric: She has a normal mood and affect. Her behavior is normal. Thought content normal.   Vitals reviewed.    Blood pressure 117/72, pulse 88, height 64 cm (25.21\"), weight 59 kg (130 lb).    Medication List:   Current Outpatient Prescriptions   Medication Sig Dispense Refill   • clonazePAM (KlonoPIN) 1 MG tablet Take 0.5-1 tablets by mouth 2 (Two) Times a Day As Needed for Anxiety. Please call in 60 tablet 0   • gabapentin (NEURONTIN) 300 MG capsule Take 300 mg by mouth 2 (Two) Times a Day.     • Melatonin 3 MG capsule Take 3 mg by mouth Every Night. 30 each 0   • mirtazapine (REMERON) 7.5 MG tablet Take 1 tablet by mouth as needed at bedtime for sleep 30 tablet 1   • omeprazole (priLOSEC) 20 MG capsule Take 20 mg by mouth Daily.     • prazosin (MINIPRESS) 1 MG capsule Take 1 capsule by mouth Every Night. 30 capsule 1   • raNITIdine (ZANTAC) 150 MG tablet Take 150 mg by mouth Every Night.     • sertraline (ZOLOFT) 100 MG tablet Take 2 tablets by mouth Every Night. 60 tablet 1     No current facility-administered medications for this visit.        Mental Status Exam:   Hygiene:   good  Cooperation:  Cooperative  Eye Contact:  Good  Psychomotor Behavior:  Appropriate  Affect:  Full range  Hopelessness: Denies  Speech:  Normal  Thought Process:  Goal directed and Linear  Thought Content:  Normal  Suicidal:  None  Homicidal:  None  Hallucinations:  None  Delusion:  None  Memory:  " Intact  Orientation:  Person, Place, Time and Situation  Reliability:  fair  Insight:  Fair  Judgement:  Fair  Impulse Control:  Fair  Physical/Medical Issues:  No     Assessment/Plan   Diagnoses and all orders for this visit:    Medication management  -     [facility] Drug Screen    Generalized anxiety disorder  -     sertraline (ZOLOFT) 100 MG tablet; Take 2 tablets by mouth Every Night.  -     prazosin (MINIPRESS) 1 MG capsule; Take 1 capsule by mouth Every Night.  -     mirtazapine (REMERON) 7.5 MG tablet; Take 1 tablet by mouth as needed at bedtime for sleep  -     clonazePAM (KlonoPIN) 1 MG tablet; Take 0.5-1 tablets by mouth 2 (Two) Times a Day As Needed for Anxiety. Please call in    Post traumatic stress disorder (PTSD)  -     sertraline (ZOLOFT) 100 MG tablet; Take 2 tablets by mouth Every Night.  -     prazosin (MINIPRESS) 1 MG capsule; Take 1 capsule by mouth Every Night.  -     mirtazapine (REMERON) 7.5 MG tablet; Take 1 tablet by mouth as needed at bedtime for sleep  -     clonazePAM (KlonoPIN) 1 MG tablet; Take 0.5-1 tablets by mouth 2 (Two) Times a Day As Needed for Anxiety. Please call in    Mild episode of recurrent major depressive disorder  -     sertraline (ZOLOFT) 100 MG tablet; Take 2 tablets by mouth Every Night.  -     prazosin (MINIPRESS) 1 MG capsule; Take 1 capsule by mouth Every Night.  -     mirtazapine (REMERON) 7.5 MG tablet; Take 1 tablet by mouth as needed at bedtime for sleep    Continue therapy.  Pt still has ongoing anxiety and depressive symptoms which are causing impairments in important areas of functioning as described in HPI. Discussed medication options. Continue with remeron and zoloft to 200mg daily for ongoing symptoms of anxiety and depression. Reviewed the risks, benefits, and side effects of the medications; patient acknowledged and verbally consented. She will also go to psychotherapy with Cherelle to help with coping especially to help calm herself when driving.   Patient is agreeable to call the [facility].  Patient is aware to call 911 or go to the nearest ER should begin having SI/HI.       Return in about 6 weeks (around 4/30/2018).

## 2018-04-19 ENCOUNTER — OFFICE VISIT (OUTPATIENT)
Dept: PSYCHIATRY | Facility: CLINIC | Age: 53
End: 2018-04-19

## 2018-04-19 DIAGNOSIS — F43.10 POST TRAUMATIC STRESS DISORDER (PTSD): ICD-10-CM

## 2018-04-19 DIAGNOSIS — F33.0 MILD EPISODE OF RECURRENT MAJOR DEPRESSIVE DISORDER (HCC): ICD-10-CM

## 2018-04-19 DIAGNOSIS — F41.1 GENERALIZED ANXIETY DISORDER: Primary | ICD-10-CM

## 2018-04-19 PROCEDURE — 90834 PSYTX W PT 45 MINUTES: CPT | Performed by: SOCIAL WORKER
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
## 2018-05-07 ENCOUNTER — OFFICE VISIT (OUTPATIENT)
Dept: PSYCHIATRY | Facility: CLINIC | Age: 53
End: 2018-05-07

## 2018-05-07 VITALS
DIASTOLIC BLOOD PRESSURE: 81 MMHG | WEIGHT: 130 LBS | SYSTOLIC BLOOD PRESSURE: 148 MMHG | HEART RATE: 98 BPM | HEIGHT: 64 IN | BODY MASS INDEX: 22.2 KG/M2

## 2018-05-07 DIAGNOSIS — F33.0 MILD EPISODE OF RECURRENT MAJOR DEPRESSIVE DISORDER (HCC): ICD-10-CM

## 2018-05-07 DIAGNOSIS — F43.10 POST TRAUMATIC STRESS DISORDER (PTSD): ICD-10-CM

## 2018-05-07 DIAGNOSIS — F41.1 GENERALIZED ANXIETY DISORDER: Primary | ICD-10-CM

## 2018-05-07 PROCEDURE — 99214 OFFICE O/P EST MOD 30 MIN: CPT | Performed by: NURSE PRACTITIONER

## 2018-05-07 RX ORDER — SERTRALINE HYDROCHLORIDE 100 MG/1
200 TABLET, FILM COATED ORAL NIGHTLY
Qty: 60 TABLET | Refills: 0 | Status: SHIPPED | OUTPATIENT
Start: 2018-05-07 | End: 2019-05-07

## 2018-05-07 RX ORDER — MIRTAZAPINE 7.5 MG/1
TABLET, FILM COATED ORAL
Qty: 30 TABLET | Refills: 0 | Status: ON HOLD | OUTPATIENT
Start: 2018-05-07 | End: 2019-12-29

## 2018-05-07 RX ORDER — BUSPIRONE HYDROCHLORIDE 10 MG/1
10 TABLET ORAL 2 TIMES DAILY
Qty: 60 TABLET | Refills: 0 | Status: ON HOLD | OUTPATIENT
Start: 2018-05-07 | End: 2019-12-29

## 2018-05-07 RX ORDER — PRAZOSIN HYDROCHLORIDE 1 MG/1
1 CAPSULE ORAL NIGHTLY
Qty: 30 CAPSULE | Refills: 0 | Status: ON HOLD | OUTPATIENT
Start: 2018-05-07 | End: 2019-12-29

## 2018-05-07 NOTE — PROGRESS NOTES
"      Subjective   Kassandra Acevedo is a 52 y.o. female is here today for medication management follow-up.    Chief Complaint: \"It's been rough\"      History of Present Illness   Patient reports she ongoing some difficulty with adult son. She reports difficulty with nightmares and up at 5am.  Denies any side effects from medications and feels they continue to help. No new medical complaints at this time. She has allowed adult son to move into the home.. She is having ongoing difficulty with .   Denies SI/HI/AH/VH. She continues to worry excessively. She reports less difficulty with frequently occurring nightmares.  She is reporting depression is worse-especially related to her son and the chaos at home.  She is denying any thoughts to harm self or others.    The patient reports depressive symptoms including depressed mood, crying spells, insomnia, feelings of guilt, feelings of hopelessness, feelings of helplessness, feelings of worthlessness, low energy and psychomotor agitation, and have caused impairment in important areas of functioning.  Depression rated 5/10 with 10 being the worst.   She was gently confronted regarding her alcohol use-patient has been arrested for pi.  Apparently the patient was overnight in long-term initially and has a court date is next 30 days.  She was admitting to drinking 2-3 screwdrivers/2x day.  She remained evasive and difficult to assess.      The following portions of the patient's history were reviewed and updated as appropriate: allergies, current medications, past family history, past medical history, past social history, past surgical history and problem list.    Review of Systems   Constitutional: Positive for activity change. Negative for appetite change and fatigue.   HENT: Negative.    Eyes: Negative for visual disturbance.   Respiratory: Negative.    Cardiovascular: Negative.    Gastrointestinal: Negative for nausea.   Endocrine: Negative.    Genitourinary: Negative.  " "  Musculoskeletal: Negative for arthralgias.   Skin: Negative.    Allergic/Immunologic: Negative.    Neurological: Negative for dizziness, seizures and headaches.   Hematological: Negative.    Psychiatric/Behavioral: Negative for agitation, behavioral problems, confusion, decreased concentration, dysphoric mood, hallucinations, self-injury, sleep disturbance and suicidal ideas. The patient is nervous/anxious. The patient is not hyperactive.      Objective   Physical Exam   Constitutional: She is oriented to person, place, and time. She appears well-developed and well-nourished. No distress.   Neurological: She is alert and oriented to person, place, and time.   Skin: She is not diaphoretic.   Vitals reviewed.    Blood pressure 148/81, pulse 98, height 162.6 cm (64.02\"), weight 59 kg (130 lb).    Medication List:   Current Outpatient Prescriptions   Medication Sig Dispense Refill   • clonazePAM (KlonoPIN) 1 MG tablet Take 0.5-1 tablets by mouth 2 (Two) Times a Day As Needed for Anxiety. Please call in 60 tablet 0   • gabapentin (NEURONTIN) 300 MG capsule Take 300 mg by mouth 2 (Two) Times a Day.     • Melatonin 3 MG capsule Take 3 mg by mouth Every Night. 30 each 0   • mirtazapine (REMERON) 7.5 MG tablet Take 1 tablet by mouth as needed at bedtime for sleep 30 tablet 1   • omeprazole (priLOSEC) 20 MG capsule Take 20 mg by mouth Daily.     • prazosin (MINIPRESS) 1 MG capsule Take 1 capsule by mouth Every Night. 30 capsule 1   • raNITIdine (ZANTAC) 150 MG tablet Take 150 mg by mouth Every Night.     • sertraline (ZOLOFT) 100 MG tablet Take 2 tablets by mouth Every Night. 60 tablet 1     No current facility-administered medications for this visit.        Mental Status Exam:   Hygiene:   good  Cooperation:  Cooperative  Eye Contact:  Good  Psychomotor Behavior:  Appropriate  Affect:  Full range  Hopelessness: Denies  Speech:  Normal  Thought Process:  Goal directed and Linear  Thought Content:  Normal  Suicidal:  " None  Homicidal:  None  Hallucinations:  None  Delusion:  None  Memory:  Intact  Orientation:  Person, Place, Time and Situation  Reliability:  fair  Insight:  Fair  Judgement:  Fair  Impulse Control:  Fair  Physical/Medical Issues:  No     Assessment/Plan   Diagnoses and all orders for this visit:    Generalized anxiety disorder  -     sertraline (ZOLOFT) 100 MG tablet; Take 2 tablets by mouth Every Night.  -     prazosin (MINIPRESS) 1 MG capsule; Take 1 capsule by mouth Every Night.  -     mirtazapine (REMERON) 7.5 MG tablet; Take 1 tablet by mouth as needed at bedtime for sleep    Post traumatic stress disorder (PTSD)  -     sertraline (ZOLOFT) 100 MG tablet; Take 2 tablets by mouth Every Night.  -     prazosin (MINIPRESS) 1 MG capsule; Take 1 capsule by mouth Every Night.  -     mirtazapine (REMERON) 7.5 MG tablet; Take 1 tablet by mouth as needed at bedtime for sleep    Mild episode of recurrent major depressive disorder  -     sertraline (ZOLOFT) 100 MG tablet; Take 2 tablets by mouth Every Night.  -     prazosin (MINIPRESS) 1 MG capsule; Take 1 capsule by mouth Every Night.  -     mirtazapine (REMERON) 7.5 MG tablet; Take 1 tablet by mouth as needed at bedtime for sleep    Other orders  -     busPIRone (BUSPAR) 10 MG tablet; Take 1 tablet by mouth 2 (Two) Times a Day.    Long discussion was held with the patient in regards to taking benzodiazepines while consuming alcohol.  Patient's urine drug screen was reviewed and discussed with her.  Does appear that the patient has been consistently considering fairly large amounts of alcohol.  Patient is also experiences multiple negative consequences of alcohol use including relationship problems legal problems and family problems.  Patient was instructed that due to these difficulty Klonopin was not recommended due to potential for interaction and addiction and dependency.  Patient had had a prescription left and was instructed to taper at 1 a day for a week and a  half a day for a week then discontinue.  We'll also begin low-dose of  Bupar.   She is denying any withdrawal symptoms.  Patient was instructed on potential withdrawal symptoms and encouraged to come to the emergency room soon as possible if she had withdrawal symptoms.  Patient was reluctantly agreeable to new plan.  Continue therapy.  Pt still has ongoing anxiety and depressive symptoms which are causing impairments in important areas of functioning as described in HPI. Discussed medication options. Continue with remeron and zoloft to 200mg daily for ongoing symptoms of anxiety and depression. Reviewed the risks, benefits, and side effects of the medications; patient acknowledged and verbally consented. She will also go to psychotherapy with Cherelle to help with coping especially to help calm herself when driving.  Patient is agreeable to call the [facility].  Patient is aware to call 911 or go to the nearest ER should begin having SI/HI.       Return in about 4 weeks (around 6/4/2018).

## 2018-05-17 ENCOUNTER — OFFICE VISIT (OUTPATIENT)
Dept: PSYCHIATRY | Facility: CLINIC | Age: 53
End: 2018-05-17

## 2018-05-17 DIAGNOSIS — F33.0 MILD EPISODE OF RECURRENT MAJOR DEPRESSIVE DISORDER (HCC): ICD-10-CM

## 2018-05-17 DIAGNOSIS — F41.1 GENERALIZED ANXIETY DISORDER: ICD-10-CM

## 2018-05-17 DIAGNOSIS — F43.10 POST TRAUMATIC STRESS DISORDER (PTSD): ICD-10-CM

## 2018-05-17 DIAGNOSIS — F41.1 GENERALIZED ANXIETY DISORDER: Primary | ICD-10-CM

## 2018-05-17 PROCEDURE — 90834 PSYTX W PT 45 MINUTES: CPT | Performed by: SOCIAL WORKER

## 2018-05-17 RX ORDER — CLONAZEPAM 1 MG/1
TABLET ORAL
Qty: 60 TABLET | Refills: 0 | OUTPATIENT
Start: 2018-05-17

## 2018-06-12 ENCOUNTER — DOCUMENTATION (OUTPATIENT)
Dept: PSYCHIATRY | Facility: CLINIC | Age: 53
End: 2018-06-12

## 2019-12-28 ENCOUNTER — HOSPITAL ENCOUNTER (EMERGENCY)
Facility: HOSPITAL | Age: 54
Discharge: PSYCHIATRIC OR SUBSTANCE USE FACILITY | End: 2019-12-29
Attending: EMERGENCY MEDICINE | Admitting: EMERGENCY MEDICINE

## 2019-12-28 DIAGNOSIS — F10.10 ALCOHOL ABUSE: ICD-10-CM

## 2019-12-28 DIAGNOSIS — E87.6 HYPOKALEMIA: ICD-10-CM

## 2019-12-28 DIAGNOSIS — E83.42 HYPOMAGNESEMIA: Primary | ICD-10-CM

## 2019-12-28 LAB
6-ACETYL MORPHINE: NEGATIVE
ALBUMIN SERPL-MCNC: 4.46 G/DL (ref 3.5–5.2)
ALBUMIN/GLOB SERPL: 1.3 G/DL
ALP SERPL-CCNC: 115 U/L (ref 39–117)
ALT SERPL W P-5'-P-CCNC: 24 U/L (ref 1–33)
AMPHET+METHAMPHET UR QL: NEGATIVE
ANION GAP SERPL CALCULATED.3IONS-SCNC: 18.2 MMOL/L (ref 5–15)
AST SERPL-CCNC: 66 U/L (ref 1–32)
BACTERIA UR QL AUTO: ABNORMAL /HPF
BARBITURATES UR QL SCN: NEGATIVE
BASOPHILS # BLD AUTO: 0.03 10*3/MM3 (ref 0–0.2)
BASOPHILS NFR BLD AUTO: 0.3 % (ref 0–1.5)
BENZODIAZ UR QL SCN: NEGATIVE
BILIRUB SERPL-MCNC: 1.1 MG/DL (ref 0.2–1.2)
BILIRUB UR QL STRIP: NEGATIVE
BUN BLD-MCNC: 13 MG/DL (ref 6–20)
BUN/CREAT SERPL: 14.4 (ref 7–25)
BUPRENORPHINE SERPL-MCNC: NEGATIVE NG/ML
CALCIUM SPEC-SCNC: 9 MG/DL (ref 8.6–10.5)
CANNABINOIDS SERPL QL: NEGATIVE
CHLORIDE SERPL-SCNC: 92 MMOL/L (ref 98–107)
CLARITY UR: CLEAR
CO2 SERPL-SCNC: 27.8 MMOL/L (ref 22–29)
COCAINE UR QL: NEGATIVE
COLOR UR: YELLOW
CREAT BLD-MCNC: 0.9 MG/DL (ref 0.57–1)
DEPRECATED RDW RBC AUTO: 57.4 FL (ref 37–54)
EOSINOPHIL # BLD AUTO: 0.07 10*3/MM3 (ref 0–0.4)
EOSINOPHIL NFR BLD AUTO: 0.8 % (ref 0.3–6.2)
ERYTHROCYTE [DISTWIDTH] IN BLOOD BY AUTOMATED COUNT: 14.7 % (ref 12.3–15.4)
ETHANOL BLD-MCNC: <10 MG/DL (ref 0–10)
ETHANOL UR QL: <0.01 %
GFR SERPL CREATININE-BSD FRML MDRD: 65 ML/MIN/1.73
GLOBULIN UR ELPH-MCNC: 3.4 GM/DL
GLUCOSE BLD-MCNC: 162 MG/DL (ref 65–99)
GLUCOSE UR STRIP-MCNC: NEGATIVE MG/DL
HCT VFR BLD AUTO: 41.5 % (ref 34–46.6)
HGB BLD-MCNC: 14.2 G/DL (ref 12–15.9)
HGB UR QL STRIP.AUTO: NEGATIVE
HYALINE CASTS UR QL AUTO: ABNORMAL /LPF
IMM GRANULOCYTES # BLD AUTO: 0.08 10*3/MM3 (ref 0–0.05)
IMM GRANULOCYTES NFR BLD AUTO: 0.9 % (ref 0–0.5)
KETONES UR QL STRIP: NEGATIVE
LEUKOCYTE ESTERASE UR QL STRIP.AUTO: ABNORMAL
LYMPHOCYTES # BLD AUTO: 1.24 10*3/MM3 (ref 0.7–3.1)
LYMPHOCYTES NFR BLD AUTO: 14.2 % (ref 19.6–45.3)
MAGNESIUM SERPL-MCNC: 1 MG/DL (ref 1.6–2.6)
MAGNESIUM SERPL-MCNC: 5.2 MG/DL (ref 1.6–2.6)
MCH RBC QN AUTO: 36.5 PG (ref 26.6–33)
MCHC RBC AUTO-ENTMCNC: 34.2 G/DL (ref 31.5–35.7)
MCV RBC AUTO: 106.7 FL (ref 79–97)
METHADONE UR QL SCN: NEGATIVE
MONOCYTES # BLD AUTO: 0.77 10*3/MM3 (ref 0.1–0.9)
MONOCYTES NFR BLD AUTO: 8.8 % (ref 5–12)
NEUTROPHILS # BLD AUTO: 6.56 10*3/MM3 (ref 1.7–7)
NEUTROPHILS NFR BLD AUTO: 75 % (ref 42.7–76)
NITRITE UR QL STRIP: NEGATIVE
NRBC BLD AUTO-RTO: 0 /100 WBC (ref 0–0.2)
OPIATES UR QL: NEGATIVE
OXYCODONE UR QL SCN: NEGATIVE
PCP UR QL SCN: NEGATIVE
PH UR STRIP.AUTO: 6.5 [PH] (ref 5–8)
PLATELET # BLD AUTO: 145 10*3/MM3 (ref 140–450)
PMV BLD AUTO: 9.6 FL (ref 6–12)
POTASSIUM BLD-SCNC: 3 MMOL/L (ref 3.5–5.2)
POTASSIUM BLD-SCNC: 3.4 MMOL/L (ref 3.5–5.2)
PROT SERPL-MCNC: 7.9 G/DL (ref 6–8.5)
PROT UR QL STRIP: NEGATIVE
RBC # BLD AUTO: 3.89 10*6/MM3 (ref 3.77–5.28)
RBC # UR: ABNORMAL /HPF
REF LAB TEST METHOD: ABNORMAL
SODIUM BLD-SCNC: 138 MMOL/L (ref 136–145)
SP GR UR STRIP: 1.01 (ref 1–1.03)
SQUAMOUS #/AREA URNS HPF: ABNORMAL /HPF
UROBILINOGEN UR QL STRIP: ABNORMAL
WBC NRBC COR # BLD: 8.75 10*3/MM3 (ref 3.4–10.8)
WBC UR QL AUTO: ABNORMAL /HPF

## 2019-12-28 PROCEDURE — 25010000003 POTASSIUM CHLORIDE 10 MEQ/100ML SOLUTION: Performed by: PHYSICIAN ASSISTANT

## 2019-12-28 PROCEDURE — 25010000002 THIAMINE PER 100 MG: Performed by: PHYSICIAN ASSISTANT

## 2019-12-28 PROCEDURE — 80307 DRUG TEST PRSMV CHEM ANLYZR: CPT | Performed by: PHYSICIAN ASSISTANT

## 2019-12-28 PROCEDURE — 96367 TX/PROPH/DG ADDL SEQ IV INF: CPT

## 2019-12-28 PROCEDURE — 93005 ELECTROCARDIOGRAM TRACING: CPT | Performed by: PHYSICIAN ASSISTANT

## 2019-12-28 PROCEDURE — 25010000002 FUROSEMIDE PER 20 MG: Performed by: NURSE PRACTITIONER

## 2019-12-28 PROCEDURE — 99284 EMERGENCY DEPT VISIT MOD MDM: CPT

## 2019-12-28 PROCEDURE — 96368 THER/DIAG CONCURRENT INF: CPT

## 2019-12-28 PROCEDURE — 99285 EMERGENCY DEPT VISIT HI MDM: CPT

## 2019-12-28 PROCEDURE — 25010000002 MAGNESIUM SULFATE 2 GM/50ML SOLUTION: Performed by: PHYSICIAN ASSISTANT

## 2019-12-28 PROCEDURE — 84132 ASSAY OF SERUM POTASSIUM: CPT | Performed by: PHYSICIAN ASSISTANT

## 2019-12-28 PROCEDURE — 36415 COLL VENOUS BLD VENIPUNCTURE: CPT

## 2019-12-28 PROCEDURE — 85025 COMPLETE CBC W/AUTO DIFF WBC: CPT | Performed by: PHYSICIAN ASSISTANT

## 2019-12-28 PROCEDURE — 81001 URINALYSIS AUTO W/SCOPE: CPT | Performed by: PHYSICIAN ASSISTANT

## 2019-12-28 PROCEDURE — 83735 ASSAY OF MAGNESIUM: CPT | Performed by: PHYSICIAN ASSISTANT

## 2019-12-28 PROCEDURE — 25010000002 MAGNESIUM SULFATE PER 500 MG OF MAGNESIUM: Performed by: PHYSICIAN ASSISTANT

## 2019-12-28 PROCEDURE — 80053 COMPREHEN METABOLIC PANEL: CPT | Performed by: PHYSICIAN ASSISTANT

## 2019-12-28 PROCEDURE — 96361 HYDRATE IV INFUSION ADD-ON: CPT

## 2019-12-28 PROCEDURE — 96365 THER/PROPH/DIAG IV INF INIT: CPT

## 2019-12-28 PROCEDURE — 96366 THER/PROPH/DIAG IV INF ADDON: CPT

## 2019-12-28 RX ORDER — MAGNESIUM SULFATE HEPTAHYDRATE 40 MG/ML
2 INJECTION, SOLUTION INTRAVENOUS ONCE
Status: COMPLETED | OUTPATIENT
Start: 2019-12-28 | End: 2019-12-28

## 2019-12-28 RX ORDER — NICOTINE 21 MG/24HR
1 PATCH, TRANSDERMAL 24 HOURS TRANSDERMAL ONCE
Status: DISCONTINUED | OUTPATIENT
Start: 2019-12-28 | End: 2019-12-29 | Stop reason: HOSPADM

## 2019-12-28 RX ORDER — MAGNESIUM SULFATE HEPTAHYDRATE 40 MG/ML
2 INJECTION, SOLUTION INTRAVENOUS
Status: DISCONTINUED | OUTPATIENT
Start: 2019-12-28 | End: 2019-12-28

## 2019-12-28 RX ORDER — CHLORDIAZEPOXIDE HYDROCHLORIDE 25 MG/1
50 CAPSULE, GELATIN COATED ORAL ONCE
Status: COMPLETED | OUTPATIENT
Start: 2019-12-28 | End: 2019-12-28

## 2019-12-28 RX ORDER — POTASSIUM CHLORIDE 20 MEQ/1
40 TABLET, EXTENDED RELEASE ORAL ONCE
Status: COMPLETED | OUTPATIENT
Start: 2019-12-28 | End: 2019-12-28

## 2019-12-28 RX ORDER — MAGNESIUM SULFATE HEPTAHYDRATE 40 MG/ML
2 INJECTION, SOLUTION INTRAVENOUS AS NEEDED
Status: DISCONTINUED | OUTPATIENT
Start: 2019-12-28 | End: 2019-12-29 | Stop reason: HOSPADM

## 2019-12-28 RX ORDER — POTASSIUM CHLORIDE 7.45 MG/ML
10 INJECTION INTRAVENOUS
Status: DISCONTINUED | OUTPATIENT
Start: 2019-12-28 | End: 2019-12-29 | Stop reason: HOSPADM

## 2019-12-28 RX ORDER — SODIUM CHLORIDE 0.9 % (FLUSH) 0.9 %
10 SYRINGE (ML) INJECTION AS NEEDED
Status: DISCONTINUED | OUTPATIENT
Start: 2019-12-28 | End: 2019-12-29 | Stop reason: HOSPADM

## 2019-12-28 RX ORDER — POTASSIUM CHLORIDE 7.45 MG/ML
10 INJECTION INTRAVENOUS
Status: DISPENSED | OUTPATIENT
Start: 2019-12-28 | End: 2019-12-28

## 2019-12-28 RX ORDER — MAGNESIUM SULFATE HEPTAHYDRATE 40 MG/ML
4 INJECTION, SOLUTION INTRAVENOUS AS NEEDED
Status: DISCONTINUED | OUTPATIENT
Start: 2019-12-28 | End: 2019-12-29 | Stop reason: HOSPADM

## 2019-12-28 RX ORDER — FUROSEMIDE 10 MG/ML
20 INJECTION INTRAMUSCULAR; INTRAVENOUS ONCE
Status: COMPLETED | OUTPATIENT
Start: 2019-12-28 | End: 2019-12-28

## 2019-12-28 RX ADMIN — POTASSIUM CHLORIDE 10 MEQ: 7.46 INJECTION, SOLUTION INTRAVENOUS at 16:23

## 2019-12-28 RX ADMIN — MAGNESIUM SULFATE HEPTAHYDRATE 2 G: 40 INJECTION, SOLUTION INTRAVENOUS at 19:15

## 2019-12-28 RX ADMIN — POTASSIUM CHLORIDE 10 MEQ: 7.46 INJECTION, SOLUTION INTRAVENOUS at 20:07

## 2019-12-28 RX ADMIN — CHLORDIAZEPOXIDE HYDROCHLORIDE 50 MG: 25 CAPSULE ORAL at 18:33

## 2019-12-28 RX ADMIN — POTASSIUM CHLORIDE 40 MEQ: 1500 TABLET, EXTENDED RELEASE ORAL at 16:21

## 2019-12-28 RX ADMIN — POTASSIUM CHLORIDE 40 MEQ: 1500 TABLET, EXTENDED RELEASE ORAL at 22:30

## 2019-12-28 RX ADMIN — POTASSIUM CHLORIDE 10 MEQ: 7.46 INJECTION, SOLUTION INTRAVENOUS at 19:59

## 2019-12-28 RX ADMIN — SODIUM CHLORIDE 1000 ML: 9 INJECTION, SOLUTION INTRAVENOUS at 23:26

## 2019-12-28 RX ADMIN — FUROSEMIDE 20 MG: 10 INJECTION, SOLUTION INTRAMUSCULAR; INTRAVENOUS at 23:27

## 2019-12-28 RX ADMIN — POTASSIUM CHLORIDE 10 MEQ: 7.46 INJECTION, SOLUTION INTRAVENOUS at 21:08

## 2019-12-28 RX ADMIN — THIAMINE HYDROCHLORIDE 1000 ML/HR: 100 INJECTION, SOLUTION INTRAMUSCULAR; INTRAVENOUS at 16:59

## 2019-12-28 RX ADMIN — MAGNESIUM SULFATE IN WATER 2 G: 40 INJECTION, SOLUTION INTRAVENOUS at 18:14

## 2019-12-28 RX ADMIN — MAGNESIUM SULFATE HEPTAHYDRATE 2 G: 40 INJECTION, SOLUTION INTRAVENOUS at 21:16

## 2019-12-28 RX ADMIN — POTASSIUM CHLORIDE 10 MEQ: 10 INJECTION, SOLUTION INTRAVENOUS at 22:09

## 2019-12-28 RX ADMIN — POTASSIUM CHLORIDE 10 MEQ: 7.46 INJECTION, SOLUTION INTRAVENOUS at 18:58

## 2019-12-29 ENCOUNTER — HOSPITAL ENCOUNTER (INPATIENT)
Facility: HOSPITAL | Age: 54
LOS: 4 days | Discharge: HOME | End: 2020-01-02
Attending: PSYCHIATRY & NEUROLOGY | Admitting: PSYCHIATRY & NEUROLOGY

## 2019-12-29 VITALS
HEART RATE: 108 BPM | BODY MASS INDEX: 21.34 KG/M2 | RESPIRATION RATE: 18 BRPM | OXYGEN SATURATION: 98 % | WEIGHT: 125 LBS | HEIGHT: 64 IN | DIASTOLIC BLOOD PRESSURE: 96 MMHG | TEMPERATURE: 98.6 F | SYSTOLIC BLOOD PRESSURE: 150 MMHG

## 2019-12-29 DIAGNOSIS — F10.10 ALCOHOL ABUSE: Primary | ICD-10-CM

## 2019-12-29 LAB
ALBUMIN SERPL-MCNC: 3.48 G/DL (ref 3.5–5.2)
ALBUMIN/GLOB SERPL: 1.2 G/DL
ALP SERPL-CCNC: 105 U/L (ref 39–117)
ALT SERPL W P-5'-P-CCNC: 18 U/L (ref 1–33)
ANION GAP SERPL CALCULATED.3IONS-SCNC: 11.1 MMOL/L (ref 5–15)
AST SERPL-CCNC: 41 U/L (ref 1–32)
BILIRUB SERPL-MCNC: 0.5 MG/DL (ref 0.2–1.2)
BUN BLD-MCNC: 6 MG/DL (ref 6–20)
BUN/CREAT SERPL: 8.5 (ref 7–25)
CALCIUM SPEC-SCNC: 7.6 MG/DL (ref 8.6–10.5)
CHLORIDE SERPL-SCNC: 101 MMOL/L (ref 98–107)
CO2 SERPL-SCNC: 25.9 MMOL/L (ref 22–29)
CREAT BLD-MCNC: 0.71 MG/DL (ref 0.57–1)
GFR SERPL CREATININE-BSD FRML MDRD: 86 ML/MIN/1.73
GLOBULIN UR ELPH-MCNC: 2.8 GM/DL
GLUCOSE BLD-MCNC: 118 MG/DL (ref 65–99)
HAV IGM SERPL QL IA: NORMAL
HBV CORE IGM SERPL QL IA: NORMAL
HBV SURFACE AG SERPL QL IA: NORMAL
HCV AB SER DONR QL: NORMAL
HOLD SPECIMEN: NORMAL
MAGNESIUM SERPL-MCNC: 2.7 MG/DL (ref 1.6–2.6)
POTASSIUM BLD-SCNC: 3.5 MMOL/L (ref 3.5–5.2)
PROT SERPL-MCNC: 6.3 G/DL (ref 6–8.5)
SODIUM BLD-SCNC: 138 MMOL/L (ref 136–145)

## 2019-12-29 PROCEDURE — 99223 1ST HOSP IP/OBS HIGH 75: CPT | Performed by: PSYCHIATRY & NEUROLOGY

## 2019-12-29 PROCEDURE — 80053 COMPREHEN METABOLIC PANEL: CPT | Performed by: PSYCHIATRY & NEUROLOGY

## 2019-12-29 PROCEDURE — HZ2ZZZZ DETOXIFICATION SERVICES FOR SUBSTANCE ABUSE TREATMENT: ICD-10-PCS | Performed by: PSYCHIATRY & NEUROLOGY

## 2019-12-29 PROCEDURE — 80074 ACUTE HEPATITIS PANEL: CPT | Performed by: PSYCHIATRY & NEUROLOGY

## 2019-12-29 PROCEDURE — 83735 ASSAY OF MAGNESIUM: CPT | Performed by: PSYCHIATRY & NEUROLOGY

## 2019-12-29 RX ORDER — TRAZODONE HYDROCHLORIDE 50 MG/1
50 TABLET ORAL NIGHTLY PRN
Status: DISCONTINUED | OUTPATIENT
Start: 2019-12-29 | End: 2019-12-30

## 2019-12-29 RX ORDER — MIRTAZAPINE 15 MG/1
7.5 TABLET, FILM COATED ORAL NIGHTLY
Status: DISCONTINUED | OUTPATIENT
Start: 2019-12-29 | End: 2020-01-02 | Stop reason: HOSPADM

## 2019-12-29 RX ORDER — CHLORDIAZEPOXIDE HYDROCHLORIDE 5 MG/1
5 CAPSULE, GELATIN COATED ORAL ONCE
Status: COMPLETED | OUTPATIENT
Start: 2019-12-30 | End: 2019-12-30

## 2019-12-29 RX ORDER — LORAZEPAM 1 MG/1
1 TABLET ORAL
Status: DISCONTINUED | OUTPATIENT
Start: 2020-01-01 | End: 2019-12-29

## 2019-12-29 RX ORDER — LORAZEPAM 0.5 MG/1
0.5 TABLET ORAL
Status: COMPLETED | OUTPATIENT
Start: 2020-01-01 | End: 2020-01-01

## 2019-12-29 RX ORDER — IBUPROFEN 400 MG/1
400 TABLET ORAL EVERY 6 HOURS PRN
Status: DISCONTINUED | OUTPATIENT
Start: 2019-12-29 | End: 2020-01-02 | Stop reason: HOSPADM

## 2019-12-29 RX ORDER — LORAZEPAM 2 MG/1
2 TABLET ORAL
Status: DISPENSED | OUTPATIENT
Start: 2019-12-29 | End: 2019-12-30

## 2019-12-29 RX ORDER — LORAZEPAM 2 MG/1
2 TABLET ORAL EVERY 4 HOURS PRN
Status: DISPENSED | OUTPATIENT
Start: 2019-12-30 | End: 2019-12-31

## 2019-12-29 RX ORDER — LOPERAMIDE HYDROCHLORIDE 2 MG/1
2 CAPSULE ORAL
Status: DISCONTINUED | OUTPATIENT
Start: 2019-12-29 | End: 2020-01-02 | Stop reason: HOSPADM

## 2019-12-29 RX ORDER — ALUMINA, MAGNESIA, AND SIMETHICONE 2400; 2400; 240 MG/30ML; MG/30ML; MG/30ML
15 SUSPENSION ORAL EVERY 6 HOURS PRN
Status: DISCONTINUED | OUTPATIENT
Start: 2019-12-29 | End: 2020-01-02 | Stop reason: HOSPADM

## 2019-12-29 RX ORDER — ECHINACEA PURPUREA EXTRACT 125 MG
2 TABLET ORAL AS NEEDED
Status: DISCONTINUED | OUTPATIENT
Start: 2019-12-29 | End: 2020-01-02 | Stop reason: HOSPADM

## 2019-12-29 RX ORDER — ONDANSETRON 4 MG/1
4 TABLET, FILM COATED ORAL EVERY 6 HOURS PRN
Status: DISCONTINUED | OUTPATIENT
Start: 2019-12-29 | End: 2020-01-02 | Stop reason: HOSPADM

## 2019-12-29 RX ORDER — LORAZEPAM 0.5 MG/1
0.5 TABLET ORAL
Status: DISCONTINUED | OUTPATIENT
Start: 2020-01-02 | End: 2019-12-29

## 2019-12-29 RX ORDER — BENZONATATE 100 MG/1
100 CAPSULE ORAL 3 TIMES DAILY PRN
Status: DISCONTINUED | OUTPATIENT
Start: 2019-12-29 | End: 2020-01-02 | Stop reason: HOSPADM

## 2019-12-29 RX ORDER — LORAZEPAM 1 MG/1
1 TABLET ORAL EVERY 4 HOURS PRN
Status: ACTIVE | OUTPATIENT
Start: 2020-01-01 | End: 2020-01-02

## 2019-12-29 RX ORDER — LORAZEPAM 2 MG/1
2 TABLET ORAL
Status: DISCONTINUED | OUTPATIENT
Start: 2019-12-30 | End: 2019-12-29

## 2019-12-29 RX ORDER — NICOTINE 21 MG/24HR
1 PATCH, TRANSDERMAL 24 HOURS TRANSDERMAL
Status: DISCONTINUED | OUTPATIENT
Start: 2019-12-29 | End: 2020-01-02

## 2019-12-29 RX ORDER — LORAZEPAM 0.5 MG/1
0.5 TABLET ORAL
Status: COMPLETED | OUTPATIENT
Start: 2019-12-31 | End: 2019-12-31

## 2019-12-29 RX ORDER — CHLORDIAZEPOXIDE HYDROCHLORIDE 10 MG/1
10 CAPSULE, GELATIN COATED ORAL ONCE
Status: COMPLETED | OUTPATIENT
Start: 2019-12-29 | End: 2019-12-29

## 2019-12-29 RX ORDER — FAMOTIDINE 20 MG/1
20 TABLET, FILM COATED ORAL 2 TIMES DAILY PRN
Status: DISCONTINUED | OUTPATIENT
Start: 2019-12-29 | End: 2020-01-02 | Stop reason: HOSPADM

## 2019-12-29 RX ORDER — HYDROXYZINE 50 MG/1
50 TABLET, FILM COATED ORAL EVERY 6 HOURS PRN
Status: DISCONTINUED | OUTPATIENT
Start: 2019-12-29 | End: 2020-01-02 | Stop reason: HOSPADM

## 2019-12-29 RX ORDER — BENZTROPINE MESYLATE 1 MG/ML
1 INJECTION INTRAMUSCULAR; INTRAVENOUS ONCE AS NEEDED
Status: DISCONTINUED | OUTPATIENT
Start: 2019-12-29 | End: 2020-01-02 | Stop reason: HOSPADM

## 2019-12-29 RX ORDER — BENZTROPINE MESYLATE 1 MG/1
2 TABLET ORAL ONCE AS NEEDED
Status: DISCONTINUED | OUTPATIENT
Start: 2019-12-29 | End: 2020-01-02 | Stop reason: HOSPADM

## 2019-12-29 RX ORDER — LORAZEPAM 1 MG/1
1 TABLET ORAL
Status: COMPLETED | OUTPATIENT
Start: 2019-12-30 | End: 2019-12-30

## 2019-12-29 RX ORDER — LORAZEPAM 0.5 MG/1
0.5 TABLET ORAL EVERY 4 HOURS PRN
Status: DISCONTINUED | OUTPATIENT
Start: 2020-01-02 | End: 2020-01-02 | Stop reason: HOSPADM

## 2019-12-29 RX ADMIN — FAMOTIDINE 20 MG: 20 TABLET, FILM COATED ORAL at 14:13

## 2019-12-29 RX ADMIN — LORAZEPAM 1.5 MG: 1 TABLET ORAL at 15:13

## 2019-12-29 RX ADMIN — MIRTAZAPINE 7.5 MG: 15 TABLET, FILM COATED ORAL at 21:13

## 2019-12-29 RX ADMIN — TRAZODONE HYDROCHLORIDE 50 MG: 50 TABLET ORAL at 01:44

## 2019-12-29 RX ADMIN — LORAZEPAM 1.5 MG: 1 TABLET ORAL at 21:12

## 2019-12-29 RX ADMIN — CHLORDIAZEPOXIDE HYDROCHLORIDE 10 MG: 10 CAPSULE ORAL at 14:12

## 2019-12-29 RX ADMIN — LORAZEPAM 2 MG: 2 TABLET ORAL at 01:44

## 2019-12-29 RX ADMIN — LORAZEPAM 2 MG: 2 TABLET ORAL at 08:27

## 2019-12-30 PROCEDURE — 99233 SBSQ HOSP IP/OBS HIGH 50: CPT | Performed by: PSYCHIATRY & NEUROLOGY

## 2019-12-30 PROCEDURE — 93010 ELECTROCARDIOGRAM REPORT: CPT | Performed by: INTERNAL MEDICINE

## 2019-12-30 PROCEDURE — 93005 ELECTROCARDIOGRAM TRACING: CPT | Performed by: PSYCHIATRY & NEUROLOGY

## 2019-12-30 RX ORDER — CHLORDIAZEPOXIDE HYDROCHLORIDE 10 MG/1
10 CAPSULE, GELATIN COATED ORAL 2 TIMES DAILY
Status: DISCONTINUED | OUTPATIENT
Start: 2019-12-30 | End: 2020-01-01

## 2019-12-30 RX ORDER — CHLORDIAZEPOXIDE HYDROCHLORIDE 10 MG/1
10 CAPSULE, GELATIN COATED ORAL ONCE
Status: COMPLETED | OUTPATIENT
Start: 2019-12-30 | End: 2019-12-30

## 2019-12-30 RX ORDER — TRAZODONE HYDROCHLORIDE 50 MG/1
50 TABLET ORAL NIGHTLY
Status: DISCONTINUED | OUTPATIENT
Start: 2019-12-30 | End: 2020-01-01

## 2019-12-30 RX ADMIN — LORAZEPAM 2 MG: 2 TABLET ORAL at 19:00

## 2019-12-30 RX ADMIN — CHLORDIAZEPOXIDE HYDROCHLORIDE 10 MG: 10 CAPSULE ORAL at 21:05

## 2019-12-30 RX ADMIN — IBUPROFEN 400 MG: 400 TABLET, FILM COATED ORAL at 22:28

## 2019-12-30 RX ADMIN — TRAZODONE HYDROCHLORIDE 50 MG: 50 TABLET ORAL at 21:05

## 2019-12-30 RX ADMIN — CHLORDIAZEPOXIDE HYDROCHLORIDE 10 MG: 10 CAPSULE ORAL at 16:43

## 2019-12-30 RX ADMIN — LORAZEPAM 2 MG: 2 TABLET ORAL at 01:53

## 2019-12-30 RX ADMIN — LORAZEPAM 1 MG: 1 TABLET ORAL at 14:03

## 2019-12-30 RX ADMIN — HYDROXYZINE HYDROCHLORIDE 50 MG: 50 TABLET ORAL at 22:28

## 2019-12-30 RX ADMIN — LORAZEPAM 2 MG: 2 TABLET ORAL at 03:38

## 2019-12-30 RX ADMIN — LORAZEPAM 1 MG: 1 TABLET ORAL at 08:25

## 2019-12-30 RX ADMIN — LORAZEPAM 1 MG: 1 TABLET ORAL at 21:06

## 2019-12-30 RX ADMIN — MIRTAZAPINE 7.5 MG: 15 TABLET, FILM COATED ORAL at 21:05

## 2019-12-30 RX ADMIN — CHLORDIAZEPOXIDE HYDROCHLORIDE 5 MG: 5 CAPSULE ORAL at 10:36

## 2019-12-31 PROCEDURE — 99233 SBSQ HOSP IP/OBS HIGH 50: CPT | Performed by: PSYCHIATRY & NEUROLOGY

## 2019-12-31 RX ADMIN — MIRTAZAPINE 7.5 MG: 15 TABLET, FILM COATED ORAL at 21:04

## 2019-12-31 RX ADMIN — CHLORDIAZEPOXIDE HYDROCHLORIDE 10 MG: 10 CAPSULE ORAL at 21:04

## 2019-12-31 RX ADMIN — LORAZEPAM 0.5 MG: 0.5 TABLET ORAL at 21:04

## 2019-12-31 RX ADMIN — LORAZEPAM 0.5 MG: 0.5 TABLET ORAL at 08:33

## 2019-12-31 RX ADMIN — HYDROXYZINE HYDROCHLORIDE 50 MG: 50 TABLET ORAL at 21:04

## 2019-12-31 RX ADMIN — LORAZEPAM 0.5 MG: 0.5 TABLET ORAL at 14:34

## 2019-12-31 RX ADMIN — TRAZODONE HYDROCHLORIDE 50 MG: 50 TABLET ORAL at 21:04

## 2019-12-31 RX ADMIN — CHLORDIAZEPOXIDE HYDROCHLORIDE 10 MG: 10 CAPSULE ORAL at 09:56

## 2020-01-01 PROCEDURE — 99233 SBSQ HOSP IP/OBS HIGH 50: CPT | Performed by: PSYCHIATRY & NEUROLOGY

## 2020-01-01 RX ORDER — TRAZODONE HYDROCHLORIDE 50 MG/1
100 TABLET ORAL NIGHTLY
Status: DISCONTINUED | OUTPATIENT
Start: 2020-01-01 | End: 2020-01-02 | Stop reason: HOSPADM

## 2020-01-01 RX ORDER — CLONIDINE HYDROCHLORIDE 0.1 MG/1
0.05 TABLET ORAL ONCE
Status: COMPLETED | OUTPATIENT
Start: 2020-01-02 | End: 2020-01-01

## 2020-01-01 RX ORDER — LORAZEPAM 2 MG/1
2 TABLET ORAL ONCE
Status: COMPLETED | OUTPATIENT
Start: 2020-01-01 | End: 2020-01-01

## 2020-01-01 RX ORDER — NALTREXONE HYDROCHLORIDE 50 MG/1
25 TABLET, FILM COATED ORAL DAILY
Status: DISCONTINUED | OUTPATIENT
Start: 2020-01-01 | End: 2020-01-02

## 2020-01-01 RX ORDER — ACETAMINOPHEN 325 MG/1
650 TABLET ORAL EVERY 6 HOURS PRN
Status: DISCONTINUED | OUTPATIENT
Start: 2020-01-01 | End: 2020-01-02 | Stop reason: HOSPADM

## 2020-01-01 RX ADMIN — FAMOTIDINE 20 MG: 20 TABLET, FILM COATED ORAL at 20:53

## 2020-01-01 RX ADMIN — MIRTAZAPINE 7.5 MG: 15 TABLET, FILM COATED ORAL at 20:52

## 2020-01-01 RX ADMIN — CLONIDINE HYDROCHLORIDE 0.05 MG: 0.1 TABLET ORAL at 23:52

## 2020-01-01 RX ADMIN — LORAZEPAM 2 MG: 2 TABLET ORAL at 21:25

## 2020-01-01 RX ADMIN — NALTREXONE HYDROCHLORIDE 25 MG: 50 TABLET, FILM COATED ORAL at 13:25

## 2020-01-01 RX ADMIN — TRAZODONE HYDROCHLORIDE 100 MG: 50 TABLET ORAL at 21:25

## 2020-01-01 RX ADMIN — HYDROXYZINE HYDROCHLORIDE 50 MG: 50 TABLET ORAL at 20:53

## 2020-01-01 RX ADMIN — CHLORDIAZEPOXIDE HYDROCHLORIDE 10 MG: 10 CAPSULE ORAL at 08:05

## 2020-01-01 RX ADMIN — LORAZEPAM 0.5 MG: 0.5 TABLET ORAL at 08:05

## 2020-01-02 VITALS
BODY MASS INDEX: 21.58 KG/M2 | WEIGHT: 126.4 LBS | TEMPERATURE: 98.1 F | HEART RATE: 89 BPM | DIASTOLIC BLOOD PRESSURE: 87 MMHG | OXYGEN SATURATION: 97 % | HEIGHT: 64 IN | RESPIRATION RATE: 18 BRPM | SYSTOLIC BLOOD PRESSURE: 145 MMHG

## 2020-01-02 PROCEDURE — 99239 HOSP IP/OBS DSCHRG MGMT >30: CPT | Performed by: PSYCHIATRY & NEUROLOGY

## 2020-01-02 RX ORDER — NALTREXONE HYDROCHLORIDE 50 MG/1
50 TABLET, FILM COATED ORAL DAILY
Status: DISCONTINUED | OUTPATIENT
Start: 2020-01-03 | End: 2020-01-02 | Stop reason: HOSPADM

## 2020-01-02 RX ORDER — CHLORDIAZEPOXIDE HYDROCHLORIDE 5 MG/1
CAPSULE, GELATIN COATED ORAL
Qty: 6 CAPSULE | Refills: 0 | Status: SHIPPED | OUTPATIENT
Start: 2020-01-02 | End: 2020-01-05

## 2020-01-02 RX ORDER — CHLORDIAZEPOXIDE HYDROCHLORIDE 5 MG/1
CAPSULE, GELATIN COATED ORAL
Qty: 6 CAPSULE | Refills: 0 | Status: SHIPPED | OUTPATIENT
Start: 2020-01-02 | End: 2020-01-02 | Stop reason: SDUPTHER

## 2020-01-02 RX ORDER — TRAZODONE HYDROCHLORIDE 100 MG/1
100 TABLET ORAL NIGHTLY
Qty: 30 TABLET | Refills: 0 | Status: SHIPPED | OUTPATIENT
Start: 2020-01-02 | End: 2020-01-06 | Stop reason: SDUPTHER

## 2020-01-02 RX ORDER — MIRTAZAPINE 7.5 MG/1
7.5 TABLET, FILM COATED ORAL NIGHTLY
Qty: 30 TABLET | Refills: 0 | Status: SHIPPED | OUTPATIENT
Start: 2020-01-02 | End: 2020-01-06 | Stop reason: SDUPTHER

## 2020-01-02 RX ORDER — NALTREXONE HYDROCHLORIDE 50 MG/1
50 TABLET, FILM COATED ORAL DAILY
Qty: 30 TABLET | Refills: 0 | Status: SHIPPED | OUTPATIENT
Start: 2020-01-03 | End: 2020-02-04 | Stop reason: SDUPTHER

## 2020-01-02 RX ADMIN — NALTREXONE HYDROCHLORIDE 25 MG: 50 TABLET, FILM COATED ORAL at 08:22

## 2020-01-06 ENCOUNTER — OFFICE VISIT (OUTPATIENT)
Dept: PSYCHIATRY | Facility: CLINIC | Age: 55
End: 2020-01-06

## 2020-01-06 VITALS
BODY MASS INDEX: 22.23 KG/M2 | HEIGHT: 64 IN | SYSTOLIC BLOOD PRESSURE: 152 MMHG | DIASTOLIC BLOOD PRESSURE: 82 MMHG | WEIGHT: 130.2 LBS | HEART RATE: 99 BPM

## 2020-01-06 DIAGNOSIS — F41.1 GENERALIZED ANXIETY DISORDER: ICD-10-CM

## 2020-01-06 DIAGNOSIS — Z79.899 MEDICATION MANAGEMENT: ICD-10-CM

## 2020-01-06 DIAGNOSIS — F10.10 ALCOHOL ABUSE: Primary | ICD-10-CM

## 2020-01-06 DIAGNOSIS — F33.0 MILD EPISODE OF RECURRENT MAJOR DEPRESSIVE DISORDER (HCC): ICD-10-CM

## 2020-01-06 LAB
AMPHETAMINE CUT-OFF: ABNORMAL
BENZODIAZIPINE CUT-OFF: ABNORMAL
BUPRENORPHINE CUT-OFF: ABNORMAL
COCAINE CUT-OFF: ABNORMAL
EXTERNAL AMPHETAMINE SCREEN URINE: NEGATIVE
EXTERNAL BENZODIAZEPINE SCREEN URINE: POSITIVE
EXTERNAL BUPRENORPHINE SCREEN URINE: NEGATIVE
EXTERNAL COCAINE SCREEN URINE: NEGATIVE
EXTERNAL MDMA: NEGATIVE
EXTERNAL METHADONE SCREEN URINE: NEGATIVE
EXTERNAL METHAMPHETAMINE SCREEN URINE: NEGATIVE
EXTERNAL OPIATES SCREEN URINE: NEGATIVE
EXTERNAL OXYCODONE SCREEN URINE: POSITIVE
EXTERNAL THC SCREEN URINE: NEGATIVE
MDMA CUT-OFF: ABNORMAL
METHADONE CUT-OFF: ABNORMAL
METHAMPHETAMINE CUT-OFF: ABNORMAL
OPIATES CUT-OFF: ABNORMAL
OXYCODONE CUT-OFF: ABNORMAL
THC CUT-OFF: ABNORMAL

## 2020-01-06 PROCEDURE — 99214 OFFICE O/P EST MOD 30 MIN: CPT | Performed by: NURSE PRACTITIONER

## 2020-01-06 RX ORDER — TRAZODONE HYDROCHLORIDE 150 MG/1
150 TABLET ORAL NIGHTLY
Qty: 30 TABLET | Refills: 0 | Status: SHIPPED | OUTPATIENT
Start: 2020-01-06 | End: 2020-02-03 | Stop reason: SDUPTHER

## 2020-01-06 RX ORDER — MIRTAZAPINE 7.5 MG/1
7.5 TABLET, FILM COATED ORAL NIGHTLY
Qty: 30 TABLET | Refills: 0 | Status: SHIPPED | OUTPATIENT
Start: 2020-01-06 | End: 2020-02-03 | Stop reason: SDUPTHER

## 2020-01-06 NOTE — PROGRESS NOTES
"    Subjective   Kassandra Acevedo is a 54 y.o. female is here today for medication management follow-up.    Chief Complaint: hospital follow-up alcohol abuse     History of Present Illness   Patient presents to the clinic today for hospital follow-up. She completed alcohol detox at [facility] and was discharged on 1/3/19. She reports Trazodone is helping her fall asleep but she is unable to maintain sleep. She reports in the past she took 150 mg of Trazodone and it was helpful. She states other than this she feels \"great\". She reports some issues with short-term memory that she didn't have prior to her seizure from alcohol withdrawal. She reports continues to have some soreness in her left side rib area and left ankle due to her seizure. She reports Naltrexone is helping with cravings. She would like to receive Vivitrol injection. She reports alcohol commercials on TV trigger her cravings. She denies any relapse. She reports her  is also trying to quit drinking alcohol. She reports he works away from home in [place] during the week and when he comes home he has had a few drinks in front of her and she states that's \"cruel\". She reports she is back to work. She denies SI/HI/AH/VH.     The following portions of the patient's history were reviewed and updated as appropriate: allergies, current medications, past family history, past medical history, past social history, past surgical history and problem list.    Review of Systems   Musculoskeletal: Positive for arthralgias and myalgias.   Psychiatric/Behavioral: Positive for sleep disturbance.       Objective   Physical Exam   Constitutional: She appears well-developed. No distress.   Vitals reviewed.    Blood pressure 152/82, pulse 99, height 162.6 cm (64\"), weight 59.1 kg (130 lb 3.2 oz).    Medication List:   Current Outpatient Medications   Medication Sig Dispense Refill   • mirtazapine (REMERON) 7.5 MG tablet Take 1 tablet by mouth Every Night. Indications: " anxiety 30 tablet 0   • naltrexone (DEPADE) 50 MG tablet Take 1 tablet by mouth Daily. Indications: Excessive Use of Alcohol 30 tablet 0   • traZODone (DESYREL) 150 MG tablet Take 1 tablet by mouth Every Night. Indications: Trouble Sleeping 30 tablet 0   • Naltrexone (VIVITROL) 380 MG reconstituted suspension IM suspension Inject 380 mg into the appropriate muscle as directed by prescriber Every 28 Days. 1 each 1     No current facility-administered medications for this visit.        Labs:   Recent Results (from the past 2016 hour(s))   Comprehensive Metabolic Panel    Collection Time: 12/28/19  3:28 PM   Result Value Ref Range    Glucose 162 (H) 65 - 99 mg/dL    BUN 13 6 - 20 mg/dL    Creatinine 0.90 0.57 - 1.00 mg/dL    Sodium 138 136 - 145 mmol/L    Potassium 3.0 (L) 3.5 - 5.2 mmol/L    Chloride 92 (L) 98 - 107 mmol/L    CO2 27.8 22.0 - 29.0 mmol/L    Calcium 9.0 8.6 - 10.5 mg/dL    Total Protein 7.9 6.0 - 8.5 g/dL    Albumin 4.46 3.50 - 5.20 g/dL    ALT (SGPT) 24 1 - 33 U/L    AST (SGOT) 66 (H) 1 - 32 U/L    Alkaline Phosphatase 115 39 - 117 U/L    Total Bilirubin 1.1 0.2 - 1.2 mg/dL    eGFR Non African Amer 65 >60 mL/min/1.73    Globulin 3.4 gm/dL    A/G Ratio 1.3 g/dL    BUN/Creatinine Ratio 14.4 7.0 - 25.0    Anion Gap 18.2 (H) 5.0 - 15.0 mmol/L   Ethanol    Collection Time: 12/28/19  3:28 PM   Result Value Ref Range    Ethanol <10 0 - 10 mg/dL    Ethanol % <0.010 %   Magnesium    Collection Time: 12/28/19  3:28 PM   Result Value Ref Range    Magnesium 1.0 (L) 1.6 - 2.6 mg/dL   CBC Auto Differential    Collection Time: 12/28/19  3:28 PM   Result Value Ref Range    WBC 8.75 3.40 - 10.80 10*3/mm3    RBC 3.89 3.77 - 5.28 10*6/mm3    Hemoglobin 14.2 12.0 - 15.9 g/dL    Hematocrit 41.5 34.0 - 46.6 %    .7 (H) 79.0 - 97.0 fL    MCH 36.5 (H) 26.6 - 33.0 pg    MCHC 34.2 31.5 - 35.7 g/dL    RDW 14.7 12.3 - 15.4 %    RDW-SD 57.4 (H) 37.0 - 54.0 fl    MPV 9.6 6.0 - 12.0 fL    Platelets 145 140 - 450 10*3/mm3     Neutrophil % 75.0 42.7 - 76.0 %    Lymphocyte % 14.2 (L) 19.6 - 45.3 %    Monocyte % 8.8 5.0 - 12.0 %    Eosinophil % 0.8 0.3 - 6.2 %    Basophil % 0.3 0.0 - 1.5 %    Immature Grans % 0.9 (H) 0.0 - 0.5 %    Neutrophils, Absolute 6.56 1.70 - 7.00 10*3/mm3    Lymphocytes, Absolute 1.24 0.70 - 3.10 10*3/mm3    Monocytes, Absolute 0.77 0.10 - 0.90 10*3/mm3    Eosinophils, Absolute 0.07 0.00 - 0.40 10*3/mm3    Basophils, Absolute 0.03 0.00 - 0.20 10*3/mm3    Immature Grans, Absolute 0.08 (H) 0.00 - 0.05 10*3/mm3    nRBC 0.0 0.0 - 0.2 /100 WBC   Urinalysis With Microscopic If Indicated (No Culture) - Urine, Clean Catch    Collection Time: 12/28/19  5:26 PM   Result Value Ref Range    Color, UA Yellow Yellow, Straw    Appearance, UA Clear Clear    pH, UA 6.5 5.0 - 8.0    Specific Gravity, UA 1.009 1.005 - 1.030    Glucose, UA Negative Negative    Ketones, UA Negative Negative    Bilirubin, UA Negative Negative    Blood, UA Negative Negative    Protein, UA Negative Negative    Leuk Esterase, UA Trace (A) Negative    Nitrite, UA Negative Negative    Urobilinogen, UA 1.0 E.U./dL 0.2 - 1.0 E.U./dL   Urine Drug Screen - Urine, Clean Catch    Collection Time: 12/28/19  5:26 PM   Result Value Ref Range    Amphetamine Screen, Urine Negative Negative    Barbiturates Screen, Urine Negative Negative    Benzodiazepine Screen, Urine Negative Negative    Cocaine Screen, Urine Negative Negative    Methadone Screen, Urine Negative Negative    Opiate Screen Negative Negative    Phencyclidine (PCP), Urine Negative Negative    THC, Screen, Urine Negative Negative    6-ACETYL MORPHINE Negative Negative    Buprenorphine, Screen, Urine Negative Negative    Oxycodone Screen, Urine Negative Negative   Urinalysis, Microscopic Only - Urine, Clean Catch    Collection Time: 12/28/19  5:26 PM   Result Value Ref Range    RBC, UA None Seen None Seen, 0-2 /HPF    WBC, UA 6-12 (A) None Seen, 0-2 /HPF    Bacteria, UA Trace (A) None Seen /HPF    Squamous  Epithelial Cells, UA 7-12 (A) None Seen, 0-2 /HPF    Hyaline Casts, UA None Seen None Seen /LPF    Methodology Manual Light Microscopy    Magnesium    Collection Time: 12/28/19  9:55 PM   Result Value Ref Range    Magnesium 5.2 (H) 1.6 - 2.6 mg/dL   Potassium    Collection Time: 12/28/19  9:55 PM   Result Value Ref Range    Potassium 3.4 (L) 3.5 - 5.2 mmol/L   Comprehensive Metabolic Panel    Collection Time: 12/29/19  6:57 AM   Result Value Ref Range    Glucose 118 (H) 65 - 99 mg/dL    BUN 6 6 - 20 mg/dL    Creatinine 0.71 0.57 - 1.00 mg/dL    Sodium 138 136 - 145 mmol/L    Potassium 3.5 3.5 - 5.2 mmol/L    Chloride 101 98 - 107 mmol/L    CO2 25.9 22.0 - 29.0 mmol/L    Calcium 7.6 (L) 8.6 - 10.5 mg/dL    Total Protein 6.3 6.0 - 8.5 g/dL    Albumin 3.48 (L) 3.50 - 5.20 g/dL    ALT (SGPT) 18 1 - 33 U/L    AST (SGOT) 41 (H) 1 - 32 U/L    Alkaline Phosphatase 105 39 - 117 U/L    Total Bilirubin 0.5 0.2 - 1.2 mg/dL    eGFR Non African Amer 86 >60 mL/min/1.73    Globulin 2.8 gm/dL    A/G Ratio 1.2 g/dL    BUN/Creatinine Ratio 8.5 7.0 - 25.0    Anion Gap 11.1 5.0 - 15.0 mmol/L   Magnesium    Collection Time: 12/29/19  6:57 AM   Result Value Ref Range    Magnesium 2.7 (H) 1.6 - 2.6 mg/dL   Hepatitis Panel, Acute    Collection Time: 12/29/19  6:57 AM   Result Value Ref Range    Hepatitis B Surface Ag Non-Reactive Non-Reactive    Hep A IgM Non-Reactive Non-Reactive    Hep B C IgM Non-Reactive Non-Reactive    Hepatitis C Ab Non-Reactive Non-Reactive   Hep B Confirmation Tube    Collection Time: 12/29/19  6:57 AM   Result Value Ref Range    Extra Tube Hold for add-ons.    [facility] Drug Screen    Collection Time: 01/06/20 10:39 AM   Result Value Ref Range    External Amphetamine Screen Urine Negative     Amphetamine Cut-Off 1000ng/ml     External Benzodiazepine Screen Urine Positive     Benzodiazipine Cut-Off 300ng/ml     External Cocaine Screen Urine Negative     Cocaine Cut-Off 300ng/ml     External THC Screen Urine Negative      THC Cut-Off 50ng/ml     External Methadone Screen Urine Negative     Methadone Cut-Off 300ng/ml     External Methamphetamine Screen Urine Negative     Methamphetamine Cut-Off 1000ng/ml     External Oxycodone Screen Urine Positive     Oxycodone Cut-Off 100ng/ml     External Buprenorphine Screen Urine Negative     Buprenorphine Cut-Off 10ng/ml     External MDMA Negative     MDMA Cut-Off 500ng/ml     External Opiates Screen Urine Negative     Opiates Cut-Off 300ng/ml          Mental Status Exam:   Hygiene:   good  Cooperation:  Cooperative  Eye Contact:  Good  Psychomotor Behavior:  Appropriate  Affect:  Appropriate  Hopelessness: Denies  Speech:  Normal  Thought Process:  Goal directed and Linear  Thought Content:  Normal  Suicidal:  None  Homicidal:  None  Hallucinations:  None  Delusion:  None  Memory:  Intact  Orientation:  Person, Place, Time and Situation  Reliability:  good  Insight:  Good  Judgement:  Good  Impulse Control:  Good  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    Mild episode of recurrent major depressive disorder (CMS/HCC)  -     mirtazapine (REMERON) 7.5 MG tablet; Take 1 tablet by mouth Every Night. Indications: anxiety  -     traZODone (DESYREL) 150 MG tablet; Take 1 tablet by mouth Every Night. Indications: Trouble Sleeping    Generalized anxiety disorder  -     mirtazapine (REMERON) 7.5 MG tablet; Take 1 tablet by mouth Every Night. Indications: anxiety  -     traZODone (DESYREL) 150 MG tablet; Take 1 tablet by mouth Every Night. Indications: Trouble Sleeping    Medication management  -     [facility] Drug Screen      UDS - positive for benzodiazepine and oxycodone. She was prescribed Librium taper upon discharge.     Prognosis: Guarded dependent  on medication/follow up and treatment plan compliance.  Functionality: pt having some improvement in important areas of daily functioning.    Impression: Patient experiencing improvement in depression and anxiety since hospitalization. She denies any alcohol relapse and is doing well on naltrexone oral and wishes to receive Vivitrol injection.     Continue Remeron 7.5 mg po QHS  Increase Trazodone 150 mg po QHS   Continue Naltrexone. Patient wants to begin Vivitrol injection. She is tolerating oral. We will go ahead and start process of getting Vivitrol approved. She understands to continue oral until she receives injection. She will have injection when she comes to therapy on 1/22/20.   RTC 4 weeks   She will keep psychotherapy appointment on 1/22/20       Problem: Alcohol use, depression, anxiety  Intervention: Continue Remeron, increase trazodone, continue naltrexone and submit for approval for Vivitrol injection and montior for side effects.  Long-term goal: Overall improvement in functioning per patient's self-report  Short-term goal: Kassandra will adhere to medication regimen, therapy and 4 week follow-up appointment    Discussed medication options.  Reviewed the risks, benefits, and side effects of the medications; patient acknowledged and verbally consented.  Patient is agreeable to call the [facility].  Patient is aware to call 911 or go to the nearest ER should begin having SI/HI.

## 2020-01-22 ENCOUNTER — CLINICAL SUPPORT (OUTPATIENT)
Dept: PSYCHIATRY | Facility: CLINIC | Age: 55
End: 2020-01-22

## 2020-01-22 DIAGNOSIS — F10.10 ALCOHOL ABUSE: Primary | ICD-10-CM

## 2020-01-22 PROCEDURE — 96372 THER/PROPH/DIAG INJ SC/IM: CPT | Performed by: NURSE PRACTITIONER

## 2020-02-03 ENCOUNTER — OFFICE VISIT (OUTPATIENT)
Dept: PSYCHIATRY | Facility: CLINIC | Age: 55
End: 2020-02-03

## 2020-02-03 ENCOUNTER — LAB (OUTPATIENT)
Dept: LAB | Facility: HOSPITAL | Age: 55
End: 2020-02-03

## 2020-02-03 VITALS
HEIGHT: 64 IN | SYSTOLIC BLOOD PRESSURE: 138 MMHG | HEART RATE: 91 BPM | WEIGHT: 132 LBS | DIASTOLIC BLOOD PRESSURE: 89 MMHG | BODY MASS INDEX: 22.53 KG/M2

## 2020-02-03 DIAGNOSIS — F33.0 MILD EPISODE OF RECURRENT MAJOR DEPRESSIVE DISORDER (HCC): ICD-10-CM

## 2020-02-03 DIAGNOSIS — F41.1 GENERALIZED ANXIETY DISORDER: ICD-10-CM

## 2020-02-03 DIAGNOSIS — F10.10 ALCOHOL ABUSE: ICD-10-CM

## 2020-02-03 DIAGNOSIS — F10.10 ALCOHOL ABUSE: Primary | ICD-10-CM

## 2020-02-03 LAB
25(OH)D3 SERPL-MCNC: 22.1 NG/ML (ref 30–100)
ALBUMIN SERPL-MCNC: 4.2 G/DL (ref 3.5–5.2)
ALBUMIN/GLOB SERPL: 1.6 G/DL
ALP SERPL-CCNC: 56 U/L (ref 39–117)
ALT SERPL W P-5'-P-CCNC: 16 U/L (ref 1–33)
ANION GAP SERPL CALCULATED.3IONS-SCNC: 13.3 MMOL/L (ref 5–15)
AST SERPL-CCNC: 17 U/L (ref 1–32)
BASOPHILS # BLD AUTO: 0.04 10*3/MM3 (ref 0–0.2)
BASOPHILS NFR BLD AUTO: 0.6 % (ref 0–1.5)
BILIRUB SERPL-MCNC: 0.3 MG/DL (ref 0.2–1.2)
BUN BLD-MCNC: 6 MG/DL (ref 6–20)
BUN/CREAT SERPL: 9.1 (ref 7–25)
CALCIUM SPEC-SCNC: 9.6 MG/DL (ref 8.6–10.5)
CHLORIDE SERPL-SCNC: 95 MMOL/L (ref 98–107)
CO2 SERPL-SCNC: 25.7 MMOL/L (ref 22–29)
CREAT BLD-MCNC: 0.66 MG/DL (ref 0.57–1)
DEPRECATED RDW RBC AUTO: 47.2 FL (ref 37–54)
EOSINOPHIL # BLD AUTO: 0.13 10*3/MM3 (ref 0–0.4)
EOSINOPHIL NFR BLD AUTO: 1.9 % (ref 0.3–6.2)
ERYTHROCYTE [DISTWIDTH] IN BLOOD BY AUTOMATED COUNT: 12.9 % (ref 12.3–15.4)
ETHANOL BLD-MCNC: <10 MG/DL (ref 0–10)
ETHANOL UR QL: <0.01 %
GFR SERPL CREATININE-BSD FRML MDRD: 94 ML/MIN/1.73
GLOBULIN UR ELPH-MCNC: 2.7 GM/DL
GLUCOSE BLD-MCNC: 92 MG/DL (ref 65–99)
HCT VFR BLD AUTO: 41.6 % (ref 34–46.6)
HGB BLD-MCNC: 14.4 G/DL (ref 12–15.9)
IMM GRANULOCYTES # BLD AUTO: 0.04 10*3/MM3 (ref 0–0.05)
IMM GRANULOCYTES NFR BLD AUTO: 0.6 % (ref 0–0.5)
LYMPHOCYTES # BLD AUTO: 1.75 10*3/MM3 (ref 0.7–3.1)
LYMPHOCYTES NFR BLD AUTO: 25 % (ref 19.6–45.3)
MCH RBC QN AUTO: 34.4 PG (ref 26.6–33)
MCHC RBC AUTO-ENTMCNC: 34.6 G/DL (ref 31.5–35.7)
MCV RBC AUTO: 99.3 FL (ref 79–97)
MONOCYTES # BLD AUTO: 0.54 10*3/MM3 (ref 0.1–0.9)
MONOCYTES NFR BLD AUTO: 7.7 % (ref 5–12)
NEUTROPHILS # BLD AUTO: 4.51 10*3/MM3 (ref 1.7–7)
NEUTROPHILS NFR BLD AUTO: 64.2 % (ref 42.7–76)
NRBC BLD AUTO-RTO: 0 /100 WBC (ref 0–0.2)
PLATELET # BLD AUTO: 302 10*3/MM3 (ref 140–450)
PMV BLD AUTO: 10.4 FL (ref 6–12)
POTASSIUM BLD-SCNC: 3.9 MMOL/L (ref 3.5–5.2)
PROT SERPL-MCNC: 6.9 G/DL (ref 6–8.5)
RBC # BLD AUTO: 4.19 10*6/MM3 (ref 3.77–5.28)
SODIUM BLD-SCNC: 134 MMOL/L (ref 136–145)
VIT B12 BLD-MCNC: 315 PG/ML (ref 211–946)
WBC NRBC COR # BLD: 7.01 10*3/MM3 (ref 3.4–10.8)

## 2020-02-03 PROCEDURE — 99214 OFFICE O/P EST MOD 30 MIN: CPT | Performed by: NURSE PRACTITIONER

## 2020-02-03 PROCEDURE — 82607 VITAMIN B-12: CPT

## 2020-02-03 PROCEDURE — 85025 COMPLETE CBC W/AUTO DIFF WBC: CPT

## 2020-02-03 PROCEDURE — 82306 VITAMIN D 25 HYDROXY: CPT

## 2020-02-03 PROCEDURE — 36415 COLL VENOUS BLD VENIPUNCTURE: CPT

## 2020-02-03 PROCEDURE — 83735 ASSAY OF MAGNESIUM: CPT

## 2020-02-03 PROCEDURE — 80307 DRUG TEST PRSMV CHEM ANLYZR: CPT

## 2020-02-03 PROCEDURE — 80053 COMPREHEN METABOLIC PANEL: CPT

## 2020-02-03 RX ORDER — TRAZODONE HYDROCHLORIDE 150 MG/1
150 TABLET ORAL NIGHTLY
Qty: 30 TABLET | Refills: 1 | Status: SHIPPED | OUTPATIENT
Start: 2020-02-03 | End: 2020-03-09

## 2020-02-03 RX ORDER — MIRTAZAPINE 7.5 MG/1
7.5 TABLET, FILM COATED ORAL NIGHTLY
Qty: 30 TABLET | Refills: 1 | Status: SHIPPED | OUTPATIENT
Start: 2020-02-03 | End: 2020-03-09

## 2020-02-03 NOTE — PROGRESS NOTES
"    Subjective   Kassandra Acevedo is a 54 y.o. female is here today for medication management follow-up.    Chief Complaint: f/u Depression     History of Present Illness  No alcohol use since hospital detox. She reports feeling worse since starting Vivitrol injection. She reports joint pain,increased cravings and anxiety. She is having conflict with her  who continues to drink alcohol and she states he will brag about being able to drink and be able to work. Received last Vivtrol injection on 1/24/20. Sleep is good. She denies SI/HI/AH/VH.     The following portions of the patient's history were reviewed and updated as appropriate: allergies, current medications, past family history, past medical history, past social history, past surgical history and problem list.    Review of Systems   Psychiatric/Behavioral: Positive for decreased concentration. The patient is nervous/anxious.    All other systems reviewed and are negative.      Objective   Physical Exam   Constitutional: She appears well-developed. No distress.   Vitals reviewed.    Blood pressure 138/89, pulse 91, height 162.6 cm (64.02\"), weight 59.9 kg (132 lb).    Medication List:   Current Outpatient Medications   Medication Sig Dispense Refill   • mirtazapine (REMERON) 7.5 MG tablet Take 1 tablet by mouth Every Night. Indications: anxiety 30 tablet 1   • traZODone (DESYREL) 150 MG tablet Take 1 tablet by mouth Every Night. Indications: Trouble Sleeping 30 tablet 1   • naltrexone (DEPADE) 50 MG tablet Take 1 tablet by mouth Daily. Indications: Excessive Use of Alcohol 30 tablet 0   • vitamin D (ERGOCALCIFEROL) 1.25 MG (60527 UT) capsule capsule Take 1 capsule by mouth 1 (One) Time Per Week. 5 capsule 1     No current facility-administered medications for this visit.        Labs:   Recent Results (from the past 2016 hour(s))   Comprehensive Metabolic Panel    Collection Time: 12/28/19  3:28 PM   Result Value Ref Range    Glucose 162 (H) 65 - 99 mg/dL "    BUN 13 6 - 20 mg/dL    Creatinine 0.90 0.57 - 1.00 mg/dL    Sodium 138 136 - 145 mmol/L    Potassium 3.0 (L) 3.5 - 5.2 mmol/L    Chloride 92 (L) 98 - 107 mmol/L    CO2 27.8 22.0 - 29.0 mmol/L    Calcium 9.0 8.6 - 10.5 mg/dL    Total Protein 7.9 6.0 - 8.5 g/dL    Albumin 4.46 3.50 - 5.20 g/dL    ALT (SGPT) 24 1 - 33 U/L    AST (SGOT) 66 (H) 1 - 32 U/L    Alkaline Phosphatase 115 39 - 117 U/L    Total Bilirubin 1.1 0.2 - 1.2 mg/dL    eGFR Non African Amer 65 >60 mL/min/1.73    Globulin 3.4 gm/dL    A/G Ratio 1.3 g/dL    BUN/Creatinine Ratio 14.4 7.0 - 25.0    Anion Gap 18.2 (H) 5.0 - 15.0 mmol/L   Ethanol    Collection Time: 12/28/19  3:28 PM   Result Value Ref Range    Ethanol <10 0 - 10 mg/dL    Ethanol % <0.010 %   Magnesium    Collection Time: 12/28/19  3:28 PM   Result Value Ref Range    Magnesium 1.0 (L) 1.6 - 2.6 mg/dL   CBC Auto Differential    Collection Time: 12/28/19  3:28 PM   Result Value Ref Range    WBC 8.75 3.40 - 10.80 10*3/mm3    RBC 3.89 3.77 - 5.28 10*6/mm3    Hemoglobin 14.2 12.0 - 15.9 g/dL    Hematocrit 41.5 34.0 - 46.6 %    .7 (H) 79.0 - 97.0 fL    MCH 36.5 (H) 26.6 - 33.0 pg    MCHC 34.2 31.5 - 35.7 g/dL    RDW 14.7 12.3 - 15.4 %    RDW-SD 57.4 (H) 37.0 - 54.0 fl    MPV 9.6 6.0 - 12.0 fL    Platelets 145 140 - 450 10*3/mm3    Neutrophil % 75.0 42.7 - 76.0 %    Lymphocyte % 14.2 (L) 19.6 - 45.3 %    Monocyte % 8.8 5.0 - 12.0 %    Eosinophil % 0.8 0.3 - 6.2 %    Basophil % 0.3 0.0 - 1.5 %    Immature Grans % 0.9 (H) 0.0 - 0.5 %    Neutrophils, Absolute 6.56 1.70 - 7.00 10*3/mm3    Lymphocytes, Absolute 1.24 0.70 - 3.10 10*3/mm3    Monocytes, Absolute 0.77 0.10 - 0.90 10*3/mm3    Eosinophils, Absolute 0.07 0.00 - 0.40 10*3/mm3    Basophils, Absolute 0.03 0.00 - 0.20 10*3/mm3    Immature Grans, Absolute 0.08 (H) 0.00 - 0.05 10*3/mm3    nRBC 0.0 0.0 - 0.2 /100 WBC   Urinalysis With Microscopic If Indicated (No Culture) - Urine, Clean Catch    Collection Time: 12/28/19  5:26 PM   Result  Value Ref Range    Color, UA Yellow Yellow, Straw    Appearance, UA Clear Clear    pH, UA 6.5 5.0 - 8.0    Specific Gravity, UA 1.009 1.005 - 1.030    Glucose, UA Negative Negative    Ketones, UA Negative Negative    Bilirubin, UA Negative Negative    Blood, UA Negative Negative    Protein, UA Negative Negative    Leuk Esterase, UA Trace (A) Negative    Nitrite, UA Negative Negative    Urobilinogen, UA 1.0 E.U./dL 0.2 - 1.0 E.U./dL   Urine Drug Screen - Urine, Clean Catch    Collection Time: 12/28/19  5:26 PM   Result Value Ref Range    Amphetamine Screen, Urine Negative Negative    Barbiturates Screen, Urine Negative Negative    Benzodiazepine Screen, Urine Negative Negative    Cocaine Screen, Urine Negative Negative    Methadone Screen, Urine Negative Negative    Opiate Screen Negative Negative    Phencyclidine (PCP), Urine Negative Negative    THC, Screen, Urine Negative Negative    6-ACETYL MORPHINE Negative Negative    Buprenorphine, Screen, Urine Negative Negative    Oxycodone Screen, Urine Negative Negative   Urinalysis, Microscopic Only - Urine, Clean Catch    Collection Time: 12/28/19  5:26 PM   Result Value Ref Range    RBC, UA None Seen None Seen, 0-2 /HPF    WBC, UA 6-12 (A) None Seen, 0-2 /HPF    Bacteria, UA Trace (A) None Seen /HPF    Squamous Epithelial Cells, UA 7-12 (A) None Seen, 0-2 /HPF    Hyaline Casts, UA None Seen None Seen /LPF    Methodology Manual Light Microscopy    Magnesium    Collection Time: 12/28/19  9:55 PM   Result Value Ref Range    Magnesium 5.2 (H) 1.6 - 2.6 mg/dL   Potassium    Collection Time: 12/28/19  9:55 PM   Result Value Ref Range    Potassium 3.4 (L) 3.5 - 5.2 mmol/L   Comprehensive Metabolic Panel    Collection Time: 12/29/19  6:57 AM   Result Value Ref Range    Glucose 118 (H) 65 - 99 mg/dL    BUN 6 6 - 20 mg/dL    Creatinine 0.71 0.57 - 1.00 mg/dL    Sodium 138 136 - 145 mmol/L    Potassium 3.5 3.5 - 5.2 mmol/L    Chloride 101 98 - 107 mmol/L    CO2 25.9 22.0 - 29.0  mmol/L    Calcium 7.6 (L) 8.6 - 10.5 mg/dL    Total Protein 6.3 6.0 - 8.5 g/dL    Albumin 3.48 (L) 3.50 - 5.20 g/dL    ALT (SGPT) 18 1 - 33 U/L    AST (SGOT) 41 (H) 1 - 32 U/L    Alkaline Phosphatase 105 39 - 117 U/L    Total Bilirubin 0.5 0.2 - 1.2 mg/dL    eGFR Non African Amer 86 >60 mL/min/1.73    Globulin 2.8 gm/dL    A/G Ratio 1.2 g/dL    BUN/Creatinine Ratio 8.5 7.0 - 25.0    Anion Gap 11.1 5.0 - 15.0 mmol/L   Magnesium    Collection Time: 12/29/19  6:57 AM   Result Value Ref Range    Magnesium 2.7 (H) 1.6 - 2.6 mg/dL   Hepatitis Panel, Acute    Collection Time: 12/29/19  6:57 AM   Result Value Ref Range    Hepatitis B Surface Ag Non-Reactive Non-Reactive    Hep A IgM Non-Reactive Non-Reactive    Hep B C IgM Non-Reactive Non-Reactive    Hepatitis C Ab Non-Reactive Non-Reactive   Hep B Confirmation Tube    Collection Time: 12/29/19  6:57 AM   Result Value Ref Range    Extra Tube Hold for add-ons.    [facility] Drug Screen    Collection Time: 01/06/20 10:39 AM   Result Value Ref Range    External Amphetamine Screen Urine Negative     Amphetamine Cut-Off 1000ng/ml     External Benzodiazepine Screen Urine Positive     Benzodiazipine Cut-Off 300ng/ml     External Cocaine Screen Urine Negative     Cocaine Cut-Off 300ng/ml     External THC Screen Urine Negative     THC Cut-Off 50ng/ml     External Methadone Screen Urine Negative     Methadone Cut-Off 300ng/ml     External Methamphetamine Screen Urine Negative     Methamphetamine Cut-Off 1000ng/ml     External Oxycodone Screen Urine Positive     Oxycodone Cut-Off 100ng/ml     External Buprenorphine Screen Urine Negative     Buprenorphine Cut-Off 10ng/ml     External MDMA Negative     MDMA Cut-Off 500ng/ml     External Opiates Screen Urine Negative     Opiates Cut-Off 300ng/ml    Comprehensive Metabolic Panel    Collection Time: 02/03/20 11:21 AM   Result Value Ref Range    Glucose 92 65 - 99 mg/dL    BUN 6 6 - 20 mg/dL    Creatinine 0.66 0.57 - 1.00 mg/dL    Sodium 134  (L) 136 - 145 mmol/L    Potassium 3.9 3.5 - 5.2 mmol/L    Chloride 95 (L) 98 - 107 mmol/L    CO2 25.7 22.0 - 29.0 mmol/L    Calcium 9.6 8.6 - 10.5 mg/dL    Total Protein 6.9 6.0 - 8.5 g/dL    Albumin 4.20 3.50 - 5.20 g/dL    ALT (SGPT) 16 1 - 33 U/L    AST (SGOT) 17 1 - 32 U/L    Alkaline Phosphatase 56 39 - 117 U/L    Total Bilirubin 0.3 0.2 - 1.2 mg/dL    eGFR Non African Amer 94 >60 mL/min/1.73    Globulin 2.7 gm/dL    A/G Ratio 1.6 g/dL    BUN/Creatinine Ratio 9.1 7.0 - 25.0    Anion Gap 13.3 5.0 - 15.0 mmol/L   Vitamin B12    Collection Time: 02/03/20 11:21 AM   Result Value Ref Range    Vitamin B-12 315 211 - 946 pg/mL   Ethanol    Collection Time: 02/03/20 11:21 AM   Result Value Ref Range    Ethanol <10 0 - 10 mg/dL    Ethanol % <0.010 %   Vitamin D 25 Hydroxy    Collection Time: 02/03/20 11:21 AM   Result Value Ref Range    25 Hydroxy, Vitamin D 22.1 (L) 30.0 - 100.0 ng/ml   CBC Auto Differential    Collection Time: 02/03/20 11:21 AM   Result Value Ref Range    WBC 7.01 3.40 - 10.80 10*3/mm3    RBC 4.19 3.77 - 5.28 10*6/mm3    Hemoglobin 14.4 12.0 - 15.9 g/dL    Hematocrit 41.6 34.0 - 46.6 %    MCV 99.3 (H) 79.0 - 97.0 fL    MCH 34.4 (H) 26.6 - 33.0 pg    MCHC 34.6 31.5 - 35.7 g/dL    RDW 12.9 12.3 - 15.4 %    RDW-SD 47.2 37.0 - 54.0 fl    MPV 10.4 6.0 - 12.0 fL    Platelets 302 140 - 450 10*3/mm3    Neutrophil % 64.2 42.7 - 76.0 %    Lymphocyte % 25.0 19.6 - 45.3 %    Monocyte % 7.7 5.0 - 12.0 %    Eosinophil % 1.9 0.3 - 6.2 %    Basophil % 0.6 0.0 - 1.5 %    Immature Grans % 0.6 (H) 0.0 - 0.5 %    Neutrophils, Absolute 4.51 1.70 - 7.00 10*3/mm3    Lymphocytes, Absolute 1.75 0.70 - 3.10 10*3/mm3    Monocytes, Absolute 0.54 0.10 - 0.90 10*3/mm3    Eosinophils, Absolute 0.13 0.00 - 0.40 10*3/mm3    Basophils, Absolute 0.04 0.00 - 0.20 10*3/mm3    Immature Grans, Absolute 0.04 0.00 - 0.05 10*3/mm3    nRBC 0.0 0.0 - 0.2 /100 WBC   Magnesium    Collection Time: 02/03/20 12:20 PM   Result Value Ref Range     Magnesium 1.6 1.6 - 2.6 mg/dL         Mental Status Exam:   Hygiene:   good  Cooperation:  Cooperative  Eye Contact:  Good  Psychomotor Behavior:  Appropriate  Affect:  Restricted  Hopelessness: Denies  Speech:  Normal  Thought Process:  Goal directed and Linear  Thought Content:  Mood congruent  Suicidal:  None  Homicidal:  None  Hallucinations:  None  Delusion:  None  Memory:  Intact  Orientation:  Person, Place, Time and Situation  Reliability:  good  Insight:  Good  Judgement:  Good  Impulse Control:  Good  Physical/Medical Issues:  No     Assessment/Plan   Diagnoses and all orders for this visit:    Alcohol abuse  -     CBC & Differential; Future  -     Comprehensive Metabolic Panel; Future  -     Vitamin B12; Future  -     Cancel: Creatine, 24 Hr Ur - Urine, Clean Catch; Future  -     Cancel: Magnesium, Urine, 24 Hour - Urine, Clean Catch; Future  -     Ethanol; Future  -     Vitamin D 25 Hydroxy; Future  -     Magnesium; Future    Mild episode of recurrent major depressive disorder (CMS/HCC)  -     CBC & Differential; Future  -     Comprehensive Metabolic Panel; Future  -     Vitamin B12; Future  -     Cancel: Creatine, 24 Hr Ur - Urine, Clean Catch; Future  -     Cancel: Magnesium, Urine, 24 Hour - Urine, Clean Catch; Future  -     Ethanol; Future  -     Vitamin D 25 Hydroxy; Future  -     mirtazapine (REMERON) 7.5 MG tablet; Take 1 tablet by mouth Every Night. Indications: anxiety  -     traZODone (DESYREL) 150 MG tablet; Take 1 tablet by mouth Every Night. Indications: Trouble Sleeping  -     Magnesium; Future    Generalized anxiety disorder  -     mirtazapine (REMERON) 7.5 MG tablet; Take 1 tablet by mouth Every Night. Indications: anxiety  -     traZODone (DESYREL) 150 MG tablet; Take 1 tablet by mouth Every Night. Indications: Trouble Sleeping      Body mass index is 22.65 kg/m².. patient was educated to eat healthier diet choices and encouraged exercise.  Functionality: pt having some impairment in  important areas of daily functioning.  Prognosis: Guarded dependent on medication/follow up and treatment plan compliance.    Impression: Patient does not appear to be tolerating VIvitrol injection. She continues to struggle with her 's drinking.     Plan:  Continue current medications and we will obtain labs to assess for any underlying medical issues that may be contributing to symptoms. She has been able to maintain sobriety.   RTC 4 weeks   Encouraged psychotherapy     Discussed medication options.  Reviewed the risks, benefits, and side effects of the medications; patient acknowledged and verbally consented.  Patient is agreeable to call the [facility].  Patient is aware to call 911 or go to the nearest ER should begin having SI/HI.

## 2020-02-04 LAB — MAGNESIUM SERPL-MCNC: 1.6 MG/DL (ref 1.6–2.6)

## 2020-02-04 RX ORDER — ERGOCALCIFEROL 1.25 MG/1
50000 CAPSULE ORAL WEEKLY
Qty: 5 CAPSULE | Refills: 1 | Status: SHIPPED | OUTPATIENT
Start: 2020-02-04 | End: 2020-10-12

## 2020-02-04 RX ORDER — NALTREXONE HYDROCHLORIDE 50 MG/1
50 TABLET, FILM COATED ORAL DAILY
Qty: 30 TABLET | Refills: 0 | Status: SHIPPED | OUTPATIENT
Start: 2020-02-04 | End: 2020-03-09

## 2020-03-09 ENCOUNTER — OFFICE VISIT (OUTPATIENT)
Dept: PSYCHIATRY | Facility: CLINIC | Age: 55
End: 2020-03-09

## 2020-03-09 VITALS
SYSTOLIC BLOOD PRESSURE: 128 MMHG | HEIGHT: 64 IN | HEART RATE: 86 BPM | WEIGHT: 127 LBS | BODY MASS INDEX: 21.68 KG/M2 | DIASTOLIC BLOOD PRESSURE: 83 MMHG

## 2020-03-09 DIAGNOSIS — Z79.899 MEDICATION MANAGEMENT: ICD-10-CM

## 2020-03-09 DIAGNOSIS — F33.0 MILD EPISODE OF RECURRENT MAJOR DEPRESSIVE DISORDER (HCC): Primary | ICD-10-CM

## 2020-03-09 DIAGNOSIS — F41.1 GENERALIZED ANXIETY DISORDER: ICD-10-CM

## 2020-03-09 DIAGNOSIS — F10.11 ALCOHOL ABUSE, IN REMISSION: ICD-10-CM

## 2020-03-09 PROCEDURE — 99214 OFFICE O/P EST MOD 30 MIN: CPT | Performed by: NURSE PRACTITIONER

## 2020-03-09 RX ORDER — TRAZODONE HYDROCHLORIDE 150 MG/1
75 TABLET ORAL NIGHTLY
Qty: 30 TABLET | Refills: 1
Start: 2020-03-09 | End: 2020-06-25 | Stop reason: SDUPTHER

## 2020-03-09 NOTE — PROGRESS NOTES
"    Subjective   Kassandra Acevedo is a 54 y.o. female is here today for medication management  Chief Complaint:  Depression     History of Present Illness  Pt comes in today reporting she stopped the Vivitrol due to significant joint pain \"feels like my whole body has arthritis, but I'm 60-70% better, but I still feel is in hips, legs, knees.\" This was the first dose of Vivitrol she had received and doesn't plan to resume the oral or injection naltrexone. Pt reports having mild thoughts regarding relapsing but they are short lived, no intent/plan or acting on it. No relapses. She isn't in psychotherapy and doesn't have desire \"my son keeps me on my toes.\" She feels she has used ETOH as \"my crutch\" through the years which she knew the problem came when she couldn't sleep without it. Her last drink was Dec 27 prior to detox. Denies any SI/HI/AH/VH. No OCD, delusions, parris. She ruminate about last year in January when she went to [facility] due to insomnia \"they thought I was a nutcase and sent me to [facility], but it was because I hadn't drank in 6 days, they kept me 3 days then sent me home.\" She is sleeping well at night, only taking Trazodone 75mg prn about 5 nights a week.   Appetite is good, tolerating diet. 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 She was smoking 2 pack cigarettes day when she initially stopped drinking ETOH, but has since decreased back to 1 pack daily.       Reports last month, she cared for her 22yo son after he had surgery, states she was stressed and thought about drinking but didn't \"I felt helpless for my son, out of control.\"  The following portions of the patient's history were reviewed and updated as appropriate: allergies, current medications, past family history, past medical history, past social history, past surgical history and problem list.    Review of Systems   Constitutional: Negative for activity change, appetite change and " "fatigue.   HENT: Negative.    Eyes: Negative for visual disturbance.   Respiratory: Negative.    Cardiovascular: Negative.    Gastrointestinal: Negative for nausea.   Endocrine: Negative.    Genitourinary: Negative.    Musculoskeletal: Negative for arthralgias.   Skin: Negative.    Allergic/Immunologic: Negative.    Neurological: Negative for dizziness, seizures and headaches.   Hematological: Negative.    Psychiatric/Behavioral: Negative for agitation, behavioral problems, confusion, decreased concentration, dysphoric mood, hallucinations, self-injury, sleep disturbance and suicidal ideas. The patient is nervous/anxious. The patient is not hyperactive.    All other systems reviewed and are negative.      Objective   Physical Exam   Constitutional: She appears well-developed. No distress.   Vitals reviewed.    Blood pressure 128/83, pulse 86, height 162.6 cm (64.02\"), weight 57.6 kg (127 lb).    Medication List:   Current Outpatient Medications   Medication Sig Dispense Refill   • traZODone (DESYREL) 150 MG tablet Take 0.5 tablets by mouth Every Night. Indications: Trouble Sleeping 30 tablet 1   • vitamin D (ERGOCALCIFEROL) 1.25 MG (62000 UT) capsule capsule Take 1 capsule by mouth 1 (One) Time Per Week. 5 capsule 1     No current facility-administered medications for this visit.        Labs:   Recent Results (from the past 2016 hour(s))   Comprehensive Metabolic Panel    Collection Time: 12/28/19  3:28 PM   Result Value Ref Range    Glucose 162 (H) 65 - 99 mg/dL    BUN 13 6 - 20 mg/dL    Creatinine 0.90 0.57 - 1.00 mg/dL    Sodium 138 136 - 145 mmol/L    Potassium 3.0 (L) 3.5 - 5.2 mmol/L    Chloride 92 (L) 98 - 107 mmol/L    CO2 27.8 22.0 - 29.0 mmol/L    Calcium 9.0 8.6 - 10.5 mg/dL    Total Protein 7.9 6.0 - 8.5 g/dL    Albumin 4.46 3.50 - 5.20 g/dL    ALT (SGPT) 24 1 - 33 U/L    AST (SGOT) 66 (H) 1 - 32 U/L    Alkaline Phosphatase 115 39 - 117 U/L    Total Bilirubin 1.1 0.2 - 1.2 mg/dL    eGFR Non African Amer " 65 >60 mL/min/1.73    Globulin 3.4 gm/dL    A/G Ratio 1.3 g/dL    BUN/Creatinine Ratio 14.4 7.0 - 25.0    Anion Gap 18.2 (H) 5.0 - 15.0 mmol/L   Ethanol    Collection Time: 12/28/19  3:28 PM   Result Value Ref Range    Ethanol <10 0 - 10 mg/dL    Ethanol % <0.010 %   Magnesium    Collection Time: 12/28/19  3:28 PM   Result Value Ref Range    Magnesium 1.0 (L) 1.6 - 2.6 mg/dL   CBC Auto Differential    Collection Time: 12/28/19  3:28 PM   Result Value Ref Range    WBC 8.75 3.40 - 10.80 10*3/mm3    RBC 3.89 3.77 - 5.28 10*6/mm3    Hemoglobin 14.2 12.0 - 15.9 g/dL    Hematocrit 41.5 34.0 - 46.6 %    .7 (H) 79.0 - 97.0 fL    MCH 36.5 (H) 26.6 - 33.0 pg    MCHC 34.2 31.5 - 35.7 g/dL    RDW 14.7 12.3 - 15.4 %    RDW-SD 57.4 (H) 37.0 - 54.0 fl    MPV 9.6 6.0 - 12.0 fL    Platelets 145 140 - 450 10*3/mm3    Neutrophil % 75.0 42.7 - 76.0 %    Lymphocyte % 14.2 (L) 19.6 - 45.3 %    Monocyte % 8.8 5.0 - 12.0 %    Eosinophil % 0.8 0.3 - 6.2 %    Basophil % 0.3 0.0 - 1.5 %    Immature Grans % 0.9 (H) 0.0 - 0.5 %    Neutrophils, Absolute 6.56 1.70 - 7.00 10*3/mm3    Lymphocytes, Absolute 1.24 0.70 - 3.10 10*3/mm3    Monocytes, Absolute 0.77 0.10 - 0.90 10*3/mm3    Eosinophils, Absolute 0.07 0.00 - 0.40 10*3/mm3    Basophils, Absolute 0.03 0.00 - 0.20 10*3/mm3    Immature Grans, Absolute 0.08 (H) 0.00 - 0.05 10*3/mm3    nRBC 0.0 0.0 - 0.2 /100 WBC   Urinalysis With Microscopic If Indicated (No Culture) - Urine, Clean Catch    Collection Time: 12/28/19  5:26 PM   Result Value Ref Range    Color, UA Yellow Yellow, Straw    Appearance, UA Clear Clear    pH, UA 6.5 5.0 - 8.0    Specific Gravity, UA 1.009 1.005 - 1.030    Glucose, UA Negative Negative    Ketones, UA Negative Negative    Bilirubin, UA Negative Negative    Blood, UA Negative Negative    Protein, UA Negative Negative    Leuk Esterase, UA Trace (A) Negative    Nitrite, UA Negative Negative    Urobilinogen, UA 1.0 E.U./dL 0.2 - 1.0 E.U./dL   Urine Drug Screen -  Urine, Clean Catch    Collection Time: 12/28/19  5:26 PM   Result Value Ref Range    Amphetamine Screen, Urine Negative Negative    Barbiturates Screen, Urine Negative Negative    Benzodiazepine Screen, Urine Negative Negative    Cocaine Screen, Urine Negative Negative    Methadone Screen, Urine Negative Negative    Opiate Screen Negative Negative    Phencyclidine (PCP), Urine Negative Negative    THC, Screen, Urine Negative Negative    6-ACETYL MORPHINE Negative Negative    Buprenorphine, Screen, Urine Negative Negative    Oxycodone Screen, Urine Negative Negative   Urinalysis, Microscopic Only - Urine, Clean Catch    Collection Time: 12/28/19  5:26 PM   Result Value Ref Range    RBC, UA None Seen None Seen, 0-2 /HPF    WBC, UA 6-12 (A) None Seen, 0-2 /HPF    Bacteria, UA Trace (A) None Seen /HPF    Squamous Epithelial Cells, UA 7-12 (A) None Seen, 0-2 /HPF    Hyaline Casts, UA None Seen None Seen /LPF    Methodology Manual Light Microscopy    Magnesium    Collection Time: 12/28/19  9:55 PM   Result Value Ref Range    Magnesium 5.2 (H) 1.6 - 2.6 mg/dL   Potassium    Collection Time: 12/28/19  9:55 PM   Result Value Ref Range    Potassium 3.4 (L) 3.5 - 5.2 mmol/L   Comprehensive Metabolic Panel    Collection Time: 12/29/19  6:57 AM   Result Value Ref Range    Glucose 118 (H) 65 - 99 mg/dL    BUN 6 6 - 20 mg/dL    Creatinine 0.71 0.57 - 1.00 mg/dL    Sodium 138 136 - 145 mmol/L    Potassium 3.5 3.5 - 5.2 mmol/L    Chloride 101 98 - 107 mmol/L    CO2 25.9 22.0 - 29.0 mmol/L    Calcium 7.6 (L) 8.6 - 10.5 mg/dL    Total Protein 6.3 6.0 - 8.5 g/dL    Albumin 3.48 (L) 3.50 - 5.20 g/dL    ALT (SGPT) 18 1 - 33 U/L    AST (SGOT) 41 (H) 1 - 32 U/L    Alkaline Phosphatase 105 39 - 117 U/L    Total Bilirubin 0.5 0.2 - 1.2 mg/dL    eGFR Non African Amer 86 >60 mL/min/1.73    Globulin 2.8 gm/dL    A/G Ratio 1.2 g/dL    BUN/Creatinine Ratio 8.5 7.0 - 25.0    Anion Gap 11.1 5.0 - 15.0 mmol/L   Magnesium    Collection Time: 12/29/19   6:57 AM   Result Value Ref Range    Magnesium 2.7 (H) 1.6 - 2.6 mg/dL   Hepatitis Panel, Acute    Collection Time: 12/29/19  6:57 AM   Result Value Ref Range    Hepatitis B Surface Ag Non-Reactive Non-Reactive    Hep A IgM Non-Reactive Non-Reactive    Hep B C IgM Non-Reactive Non-Reactive    Hepatitis C Ab Non-Reactive Non-Reactive   Hep B Confirmation Tube    Collection Time: 12/29/19  6:57 AM   Result Value Ref Range    Extra Tube Hold for add-ons.    [facility] Drug Screen    Collection Time: 01/06/20 10:39 AM   Result Value Ref Range    External Amphetamine Screen Urine Negative     Amphetamine Cut-Off 1000ng/ml     External Benzodiazepine Screen Urine Positive     Benzodiazipine Cut-Off 300ng/ml     External Cocaine Screen Urine Negative     Cocaine Cut-Off 300ng/ml     External THC Screen Urine Negative     THC Cut-Off 50ng/ml     External Methadone Screen Urine Negative     Methadone Cut-Off 300ng/ml     External Methamphetamine Screen Urine Negative     Methamphetamine Cut-Off 1000ng/ml     External Oxycodone Screen Urine Positive     Oxycodone Cut-Off 100ng/ml     External Buprenorphine Screen Urine Negative     Buprenorphine Cut-Off 10ng/ml     External MDMA Negative     MDMA Cut-Off 500ng/ml     External Opiates Screen Urine Negative     Opiates Cut-Off 300ng/ml    Comprehensive Metabolic Panel    Collection Time: 02/03/20 11:21 AM   Result Value Ref Range    Glucose 92 65 - 99 mg/dL    BUN 6 6 - 20 mg/dL    Creatinine 0.66 0.57 - 1.00 mg/dL    Sodium 134 (L) 136 - 145 mmol/L    Potassium 3.9 3.5 - 5.2 mmol/L    Chloride 95 (L) 98 - 107 mmol/L    CO2 25.7 22.0 - 29.0 mmol/L    Calcium 9.6 8.6 - 10.5 mg/dL    Total Protein 6.9 6.0 - 8.5 g/dL    Albumin 4.20 3.50 - 5.20 g/dL    ALT (SGPT) 16 1 - 33 U/L    AST (SGOT) 17 1 - 32 U/L    Alkaline Phosphatase 56 39 - 117 U/L    Total Bilirubin 0.3 0.2 - 1.2 mg/dL    eGFR Non African Amer 94 >60 mL/min/1.73    Globulin 2.7 gm/dL    A/G Ratio 1.6 g/dL     BUN/Creatinine Ratio 9.1 7.0 - 25.0    Anion Gap 13.3 5.0 - 15.0 mmol/L   Vitamin B12    Collection Time: 02/03/20 11:21 AM   Result Value Ref Range    Vitamin B-12 315 211 - 946 pg/mL   Ethanol    Collection Time: 02/03/20 11:21 AM   Result Value Ref Range    Ethanol <10 0 - 10 mg/dL    Ethanol % <0.010 %   Vitamin D 25 Hydroxy    Collection Time: 02/03/20 11:21 AM   Result Value Ref Range    25 Hydroxy, Vitamin D 22.1 (L) 30.0 - 100.0 ng/ml   CBC Auto Differential    Collection Time: 02/03/20 11:21 AM   Result Value Ref Range    WBC 7.01 3.40 - 10.80 10*3/mm3    RBC 4.19 3.77 - 5.28 10*6/mm3    Hemoglobin 14.4 12.0 - 15.9 g/dL    Hematocrit 41.6 34.0 - 46.6 %    MCV 99.3 (H) 79.0 - 97.0 fL    MCH 34.4 (H) 26.6 - 33.0 pg    MCHC 34.6 31.5 - 35.7 g/dL    RDW 12.9 12.3 - 15.4 %    RDW-SD 47.2 37.0 - 54.0 fl    MPV 10.4 6.0 - 12.0 fL    Platelets 302 140 - 450 10*3/mm3    Neutrophil % 64.2 42.7 - 76.0 %    Lymphocyte % 25.0 19.6 - 45.3 %    Monocyte % 7.7 5.0 - 12.0 %    Eosinophil % 1.9 0.3 - 6.2 %    Basophil % 0.6 0.0 - 1.5 %    Immature Grans % 0.6 (H) 0.0 - 0.5 %    Neutrophils, Absolute 4.51 1.70 - 7.00 10*3/mm3    Lymphocytes, Absolute 1.75 0.70 - 3.10 10*3/mm3    Monocytes, Absolute 0.54 0.10 - 0.90 10*3/mm3    Eosinophils, Absolute 0.13 0.00 - 0.40 10*3/mm3    Basophils, Absolute 0.04 0.00 - 0.20 10*3/mm3    Immature Grans, Absolute 0.04 0.00 - 0.05 10*3/mm3    nRBC 0.0 0.0 - 0.2 /100 WBC   Magnesium    Collection Time: 02/03/20 12:20 PM   Result Value Ref Range    Magnesium 1.6 1.6 - 2.6 mg/dL   [facility] Screen    Collection Time: 03/09/20  1:16 PM   Result Value Ref Range    External Amphetamine Screen Urine Negative     Amphetamine Cut-Off 1000ng/ml     External Benzodiazepine Screen Urine Negative     Benzodiazipine Cut-Off 300ng/ml     External Cocaine Screen Urine Negative     Cocaine Cut-Off 300ng/ml     External THC Screen Urine Negative     THC Cut-Off 50ng/ml     External Methadone Screen Urine  Negative     Methadone Cut-Off 300ng/ml     External Methamphetamine Screen Urine Negative     Methamphetamine Cut-Off 1000ng/ml     External Oxycodone Screen Urine Negative     Oxycodone Cut-Off 100ng/ml     External Buprenorphine Screen Urine Negative     Buprenorphine Cut-Off 10ng/ml     External MDMA Negative     MDMA Cut-Off 500ng/ml     External Opiates Screen Urine Negative     Opiates Cut-Off 300ng/ml          Mental Status Exam:   Hygiene:   good  Cooperation:  Cooperative  Eye Contact:  Good  Psychomotor Behavior:  Appropriate  Affect:  Restricted  Hopelessness: Denies  Speech:  Normal  Thought Process:  Goal directed and Linear  Thought Content:  Mood congruent  Suicidal:  None  Homicidal:  None  Hallucinations:  None  Delusion:  None  Memory:  Intact  Orientation:  Person, Place, Time and Situation  Reliability:  good  Insight:  Good  Judgement:  Good  Impulse Control:  Good  Physical/Medical Issues:  No     Assessment/Plan   Diagnoses and all orders for this visit:    Mild episode of recurrent major depressive disorder (CMS/HCC)  -     traZODone (DESYREL) 150 MG tablet; Take 0.5 tablets by mouth Every Night. Indications: Trouble Sleeping    Generalized anxiety disorder  -     traZODone (DESYREL) 150 MG tablet; Take 0.5 tablets by mouth Every Night. Indications: Trouble Sleeping    Alcohol abuse, in remission    Medication management  -     [facility] Drug Screen      Body mass index is 21.79 kg/m².. patient was educated to eat healthier diet choices and encouraged exercise.  Functionality: pt having significant improvements in important areas of daily functioning.  Prognosis: Guarded dependent on medication/follow up and treatment plan compliance.    Impression: Patient does not appear to be tolerating Vivitrol injection. Stable mood.   Plan:  Continue Trazodone for sleep, DC Remeron no longer needed. She has been able to maintain sobriety. Discussed other meds for craving such as Campral if ever to  consider.   RTC  6 months   Encouraged psychotherapy as needed   Reviewed the risks, benefits, and side effects of the medications; patient acknowledged and verbally consented.  Patient is agreeable to call the [facility].  Patient is aware to call 911 or go to the nearest ER should begin having SI/HI.   CLYDE GREEN REVIEWED, UNREMARKABLE.

## 2020-06-25 DIAGNOSIS — F41.1 GENERALIZED ANXIETY DISORDER: ICD-10-CM

## 2020-06-25 DIAGNOSIS — F33.0 MILD EPISODE OF RECURRENT MAJOR DEPRESSIVE DISORDER (HCC): ICD-10-CM

## 2020-06-25 RX ORDER — TRAZODONE HYDROCHLORIDE 150 MG/1
75 TABLET ORAL NIGHTLY
Qty: 15 TABLET | Refills: 0
Start: 2020-06-25 | End: 2020-07-01 | Stop reason: SDUPTHER

## 2020-07-01 DIAGNOSIS — F33.0 MILD EPISODE OF RECURRENT MAJOR DEPRESSIVE DISORDER (HCC): ICD-10-CM

## 2020-07-01 DIAGNOSIS — F41.1 GENERALIZED ANXIETY DISORDER: ICD-10-CM

## 2020-07-01 RX ORDER — TRAZODONE HYDROCHLORIDE 150 MG/1
75 TABLET ORAL NIGHTLY
Qty: 15 TABLET | Refills: 0 | Status: SHIPPED | OUTPATIENT
Start: 2020-07-01 | End: 2020-07-21

## 2020-07-21 DIAGNOSIS — F33.0 MILD EPISODE OF RECURRENT MAJOR DEPRESSIVE DISORDER (HCC): ICD-10-CM

## 2020-07-21 DIAGNOSIS — F41.1 GENERALIZED ANXIETY DISORDER: ICD-10-CM

## 2020-07-21 RX ORDER — TRAZODONE HYDROCHLORIDE 150 MG/1
TABLET ORAL
Qty: 30 TABLET | Refills: 0 | Status: SHIPPED | OUTPATIENT
Start: 2020-07-21 | End: 2020-09-21 | Stop reason: SDUPTHER

## 2020-09-21 DIAGNOSIS — F41.1 GENERALIZED ANXIETY DISORDER: ICD-10-CM

## 2020-09-21 DIAGNOSIS — F33.0 MILD EPISODE OF RECURRENT MAJOR DEPRESSIVE DISORDER (HCC): ICD-10-CM

## 2020-09-21 RX ORDER — TRAZODONE HYDROCHLORIDE 150 MG/1
150 TABLET ORAL NIGHTLY
Qty: 30 TABLET | Refills: 2 | Status: SHIPPED | OUTPATIENT
Start: 2020-09-21 | End: 2020-10-12 | Stop reason: SDUPTHER

## 2020-09-21 NOTE — TELEPHONE ENCOUNTER
Patient has an appt with Kristy Cunningham on 10/12, a previous [facility] patient and she needs a refill on her trazadone.  Can you please cover this?

## 2020-10-12 ENCOUNTER — OFFICE VISIT (OUTPATIENT)
Dept: PSYCHIATRY | Facility: CLINIC | Age: 55
End: 2020-10-12

## 2020-10-12 ENCOUNTER — TELEPHONE (OUTPATIENT)
Dept: PSYCHIATRY | Facility: CLINIC | Age: 55
End: 2020-10-12

## 2020-10-12 VITALS
HEIGHT: 64 IN | HEART RATE: 85 BPM | BODY MASS INDEX: 19.84 KG/M2 | DIASTOLIC BLOOD PRESSURE: 75 MMHG | WEIGHT: 116.2 LBS | TEMPERATURE: 97 F | SYSTOLIC BLOOD PRESSURE: 120 MMHG

## 2020-10-12 DIAGNOSIS — G47.00 INSOMNIA, UNSPECIFIED TYPE: ICD-10-CM

## 2020-10-12 DIAGNOSIS — F41.1 GENERALIZED ANXIETY DISORDER: ICD-10-CM

## 2020-10-12 DIAGNOSIS — Z79.899 MEDICATION MANAGEMENT: Primary | ICD-10-CM

## 2020-10-12 DIAGNOSIS — F33.0 MILD EPISODE OF RECURRENT MAJOR DEPRESSIVE DISORDER (HCC): ICD-10-CM

## 2020-10-12 PROCEDURE — 99214 OFFICE O/P EST MOD 30 MIN: CPT | Performed by: NURSE PRACTITIONER

## 2020-10-12 RX ORDER — TRAZODONE HYDROCHLORIDE 150 MG/1
150 TABLET ORAL NIGHTLY
Qty: 30 TABLET | Refills: 1 | Status: SHIPPED | OUTPATIENT
Start: 2020-10-12 | End: 2020-10-12 | Stop reason: SDUPTHER

## 2020-10-12 RX ORDER — TRAZODONE HYDROCHLORIDE 150 MG/1
150 TABLET ORAL NIGHTLY
Qty: 30 TABLET | Refills: 1 | Status: SHIPPED | OUTPATIENT
Start: 2020-10-12

## 2020-10-12 NOTE — TELEPHONE ENCOUNTER
Patient's pharmacy called needing clarification on prescription of Trazodone. They asked if you could resend with correct sig.

## 2020-10-12 NOTE — PROGRESS NOTES
"Subjective   Kassandra Acevedo is a 55 y.o. female who presents today for follow up and transfer of care from [facility].    Chief Complaint:  Insomnia    History of Present Illness: Pt reports she has been under a significant amount of stress adjusting to her  retiring and being home throughout the day. Reports she \"just isn't happy but I am not leaving I don't want to start over\". She shares that although she has continuing sobriety, her  is a \"heavy drinker\" and at times this poses as a trigger for her but reports his behavior while intoxicated helps her remain sober. Pt shares that \"he gets sloppy drunk and I can't find him attractive when he's like that\".   Pt reports she is also having difficulties with her youngest son completing his school work in time and he has struggled with the online format. Pt has an older son that travels frequently for his job and she worries \"constantly\" concerning his health as he is HIV positive.   Pt reports trazadone 75mg has worked well for her insomnia over the years. Reports sleeping 6-7 hrs/night. Reports appetite is good. 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 Pt reports she is not interested in trying any other medications at this time and is not interested in pursuing psychotherapy, states \"I've been down that road before and it doesn't help\". Pt denies SI/HI/AVH.    The following portions of the patient's history were reviewed and updated as appropriate: allergies, current medications, past family history, past medical history, past social history, past surgical history and problem list.      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[facility])     pt reports when she was 12 years old   • GERD (gastroesophageal reflux disease)    • Night terrors    • Panic disorder    • " Psychosis (CMS/HCC)    • PTSD (post-traumatic stress disorder)        Social History:  Social History     Socioeconomic History   • Marital status:      Spouse name: Not on file   • Number of children: Not on file   • Years of education: Not on file   • Highest education level: Not on file   Tobacco Use   • Smoking status: Current Every Day Smoker     Packs/day: 1.00     Years: 33.00     Pack years: 33.00     Types: Cigarettes   • Smokeless tobacco: Never Used   • Tobacco comment: started smoking at 21 years old   Substance and Sexual Activity   • Alcohol use: Yes     Comment: liquor daily   • Drug use: No   • Sexual activity: Yes     Partners: Male       Family History:  Family History   Problem Relation Age of Onset   • Anxiety disorder Mother    • Depression Mother    • Alcohol abuse Mother    • Schizophrenia Maternal Aunt        Past Surgical History:  Past Surgical History:   Procedure Laterality Date   • BREAST SURGERY     • THYROID CYST EXCISION         Problem List:  Patient Active Problem List   Diagnosis   • Alcohol abuse       Allergy:   Allergies   Allergen Reactions   • Other GI Intolerance     Patient states she is allergic to opiates.    • Codeine    • Darvon [Propoxyphene]    • Hydrocodone    • Percocet [Oxycodone-Acetaminophen]         Current Medications:   Current Outpatient Medications   Medication Sig Dispense Refill   • traZODone (DESYREL) 150 MG tablet Take 1 tablet by mouth Every Night. Take 0.5mg tablet by mouth every night 30 tablet 1   • vitamin D (ERGOCALCIFEROL) 1.25 MG (11236 UT) capsule capsule Take 1 capsule by mouth 1 (One) Time Per Week. 5 capsule 1     No current facility-administered medications for this visit.        Review of Symptoms:    Review of Systems   Constitutional: Negative for fatigue, unexpected weight gain and unexpected weight loss.   Respiratory: Negative for shortness of breath.    Cardiovascular: Negative for chest pain and palpitations.   Genitourinary:  "Positive for decreased libido.   Skin: Negative for rash and bruise.   Psychiatric/Behavioral: Positive for sleep disturbance, depressed mood and stress. Negative for agitation, behavioral problems and decreased concentration.         Physical Exam:   Blood pressure 120/75, pulse 85, temperature 97 °F (36.1 °C), height 162.6 cm (64.02\"), weight 52.7 kg (116 lb 3.2 oz).  Body mass index is 19.94 kg/m².    Appearance: Well nourished, well dressed and in no acute distress  Gait, Station, Strength: Within normal limits    Mental Status Exam:   Hygiene:   good  Cooperation:  Guarded  Eye Contact:  Good  Psychomotor Behavior:  Appropriate  Affect:  Full range  Mood: sad  Hopelessness: Denies  Speech:  Minimal  Thought Process:  Linear  Thought Content:  Normal  Suicidal:  None  Homicidal:  None  Hallucinations:  None  Delusion:  None  Memory:  Intact  Orientation:  Person, Place, Time and Situation  Reliability:  fair  Insight:  Good  Judgement:  Good  Impulse Control:  Good  Physical/Medical Issues:  No      PHQ-Score Total:  PHQ-9 Total Score: 6      Lab Results:   Office Visit on 10/12/2020   Component Date Value Ref Range Status   • External Amphetamine Screen Urine 10/12/2020 Negative   Final   • Amphetamine Cut-Off 10/12/2020 1000ng/ml   Final   • External Benzodiazepine Screen Uri* 10/12/2020 Negative   Final   • Benzodiazipine Cut-Off 10/12/2020 300ng/ml   Final   • External Cocaine Screen Urine 10/12/2020 Negative   Final   • Cocaine Cut-Off 10/12/2020 300ng/ml   Final   • External THC Screen Urine 10/12/2020 Negative   Final   • THC Cut-Off 10/12/2020 50ng/ml   Final   • External Methadone Screen Urine 10/12/2020 Negative   Final   • Methadone Cut-Off 10/12/2020 300ng/ml   Final   • External Methamphetamine Screen Ur* 10/12/2020 Negative   Final   • Methamphetamine Cut-Off 10/12/2020 1000ng/ml   Final   • External Oxycodone Screen Urine 10/12/2020 Negative   Final   • Oxycodone Cut-Off 10/12/2020 100ng/ml   " Final   • External Buprenorphine Screen Urine 10/12/2020 Negative   Final   • Buprenorphine Cut-Off 10/12/2020 10ng/ml   Final   • External MDMA 10/12/2020 Negative   Final   • MDMA Cut-Off 10/12/2020 500ng/ml   Final   • External Opiates Screen Urine 10/12/2020 Negative   Final   • Opiates Cut-Off 10/12/2020 300ng/ml   Final       Assessment/Plan   Diagnoses and all orders for this visit:    Medication management  -     [facility] Screen    Insomnia, unspecified type  -     traZODone (DESYREL) 150 MG tablet; Take 1 tablet by mouth Every Night. Take 0.5mg tablet by mouth every night    Mild episode of recurrent major depressive disorder (CMS/HCC)  -     traZODone (DESYREL) 150 MG tablet; Take 1 tablet by mouth Every Night. Take 0.5mg tablet by mouth every night    Generalized anxiety disorder  -     traZODone (DESYREL) 150 MG tablet; Take 1 tablet by mouth Every Night. Take 0.5mg tablet by mouth every night        Visit Diagnoses:    ICD-10-CM ICD-9-CM   1. Medication management  Z79.899 V58.69   2. Insomnia, unspecified type  G47.00 780.52   3. Mild episode of recurrent major depressive disorder (CMS/HCC)  F33.0 296.31   4. Generalized anxiety disorder  F41.1 300.02       TREATMENT PLAN/GOALS: Continue supportive psychotherapy efforts and medications as indicated. Treatment and medication options discussed during today's visit. Patient acknowledged and verbally consented to continue with current treatment plan and was educated on the importance of compliance with treatment and follow-up appointments.    -Continue Trazadone 75 mg PO nightly for sleep  -Reports she has maintained sobriety, denies need for additional medication  -Encouraged psychotherapy  -UDS negative  -CLYDE reviewed and appropriate. Patient counseled on use of controlled substances.     MEDICATION ISSUES:    Discussed medication options and treatment plan of prescribed medication as well as the risks, benefits, and side effects including  potential falls, possible impaired driving and metabolic adversities among others. Patient is agreeable to call the office with any worsening of symptoms or onset of side effects. Patient is agreeable to call 911 or go to the nearest ER should he/she begin having SI/HI.     MEDS ORDERED DURING VISIT:  New Medications Ordered This Visit   Medications   • traZODone (DESYREL) 150 MG tablet     Sig: Take 1 tablet by mouth Every Night. Take 0.5mg tablet by mouth every night     Dispense:  30 tablet     Refill:  1       Return in about 3 months (around 1/12/2021).               This document has been electronically signed by MARCO Gibbons   October 12, 2020 10:40 EDT    Part of this note may be an electronic transcription/translation of spoken language to printed text using the Dragon Dictation System.

## 2021-04-28 ENCOUNTER — HOSPITAL ENCOUNTER (OUTPATIENT)
Dept: MAMMOGRAPHY | Facility: HOSPITAL | Age: 56
Discharge: HOME | End: 2021-04-28
Admitting: ADVANCED PRACTICE MIDWIFE

## 2021-04-28 DIAGNOSIS — R92.0 MAMMOGRAPHIC MICROCALCIFICATION: ICD-10-CM

## 2021-04-28 PROCEDURE — 77066 DX MAMMO INCL CAD BI: CPT | Performed by: RADIOLOGY

## 2021-04-28 PROCEDURE — 77062 BREAST TOMOSYNTHESIS BI: CPT | Performed by: RADIOLOGY

## 2021-04-28 PROCEDURE — 77066 DX MAMMO INCL CAD BI: CPT

## 2021-04-28 PROCEDURE — G0279 TOMOSYNTHESIS, MAMMO: HCPCS

## 2021-09-23 ENCOUNTER — HOSPITAL ENCOUNTER (OUTPATIENT)
Dept: MAMMOGRAPHY | Facility: HOSPITAL | Age: 56
Discharge: HOME | End: 2021-09-23
Admitting: ADVANCED PRACTICE MIDWIFE

## 2021-09-23 DIAGNOSIS — R92.8 ABNORMAL MAMMOGRAM: ICD-10-CM

## 2021-09-23 PROCEDURE — G0279 TOMOSYNTHESIS, MAMMO: HCPCS

## 2021-09-23 PROCEDURE — 77062 BREAST TOMOSYNTHESIS BI: CPT | Performed by: RADIOLOGY

## 2021-09-23 PROCEDURE — 77066 DX MAMMO INCL CAD BI: CPT

## 2021-09-23 PROCEDURE — 77066 DX MAMMO INCL CAD BI: CPT | Performed by: RADIOLOGY

## 2025-06-05 ENCOUNTER — HOSPITAL ENCOUNTER (OUTPATIENT)
Dept: ULTRASOUND IMAGING | Facility: HOSPITAL | Age: 60
Discharge: HOME | End: 2025-06-05
Payer: MEDICAID

## 2025-06-05 ENCOUNTER — HOSPITAL ENCOUNTER (OUTPATIENT)
Dept: MAMMOGRAPHY | Facility: HOSPITAL | Age: 60
Discharge: HOME | End: 2025-06-05
Payer: MEDICAID

## 2025-06-05 DIAGNOSIS — R23.4 BREAST SKIN CHANGES: ICD-10-CM

## 2025-06-05 PROCEDURE — 77062 BREAST TOMOSYNTHESIS BI: CPT | Performed by: RADIOLOGY

## 2025-06-05 PROCEDURE — G0279 TOMOSYNTHESIS, MAMMO: HCPCS

## 2025-06-05 PROCEDURE — 77066 DX MAMMO INCL CAD BI: CPT

## 2025-06-05 PROCEDURE — 76642 ULTRASOUND BREAST LIMITED: CPT | Performed by: RADIOLOGY

## 2025-06-05 PROCEDURE — 76642 ULTRASOUND BREAST LIMITED: CPT

## 2025-06-05 PROCEDURE — 77066 DX MAMMO INCL CAD BI: CPT | Performed by: RADIOLOGY

## 2025-06-12 ENCOUNTER — HOSPITAL ENCOUNTER (OUTPATIENT)
Dept: ULTRASOUND IMAGING | Facility: HOSPITAL | Age: 60
Discharge: HOME | End: 2025-06-12
Payer: MEDICAID

## 2025-06-12 ENCOUNTER — HOSPITAL ENCOUNTER (OUTPATIENT)
Dept: MAMMOGRAPHY | Facility: HOSPITAL | Age: 60
Discharge: HOME | End: 2025-06-12
Payer: MEDICAID

## 2025-06-12 DIAGNOSIS — R92.8 ABNORMAL MAMMOGRAM OF RIGHT BREAST: ICD-10-CM

## 2025-06-12 PROCEDURE — A4648 IMPLANTABLE TISSUE MARKER: HCPCS

## 2025-06-16 LAB — REF LAB TEST METHOD: NORMAL

## 2025-06-17 ENCOUNTER — TELEPHONE (OUTPATIENT)
Dept: MAMMOGRAPHY | Facility: HOSPITAL | Age: 60
End: 2025-06-17
Payer: MEDICAID

## 2025-06-17 NOTE — TELEPHONE ENCOUNTER
Patient given biopsy results and surgical consultation appointment. NN notified.    ----- Message from Sahra Masters sent at 6/16/2025  3:33 PM EDT -----  PATHOLOGY:    Invasive ductal carcinoma, grade 1.    The pathology result is concordant with the imaging findings.    Recommend surgical consultation.    The patient will be notified of the results and recommendations by our breast care nurse.

## 2025-06-18 ENCOUNTER — PATIENT OUTREACH (OUTPATIENT)
Dept: ONCOLOGY | Facility: CLINIC | Age: 60
End: 2025-06-18
Payer: MEDICAID

## 2025-06-18 NOTE — SIGNIFICANT NOTE
The Nurse Navigator contacted the patient today and introduced the role of the Nurse Navigator. Spoke to patient about upcoming appointment with Dr. OCONNOR and what to expect. Time spent talking with patient. NN contact information was provided and encouraged patient to call with any questions or concerns. Pt. Verbalized understanding. NN will follow and assist PRN.

## 2025-06-19 ENCOUNTER — OFFICE VISIT (OUTPATIENT)
Age: 60
End: 2025-06-19
Payer: MEDICAID

## 2025-06-19 VITALS — BODY MASS INDEX: 19.63 KG/M2 | WEIGHT: 115 LBS | HEIGHT: 64 IN

## 2025-06-19 DIAGNOSIS — C50.911 INVASIVE DUCTAL CARCINOMA OF RIGHT BREAST: Primary | ICD-10-CM

## 2025-06-19 RX ORDER — LEVOCETIRIZINE DIHYDROCHLORIDE 5 MG/1
TABLET, FILM COATED ORAL
COMMUNITY
Start: 2025-04-16

## 2025-06-19 RX ORDER — GABAPENTIN 300 MG/1
CAPSULE ORAL
COMMUNITY

## 2025-06-20 PROBLEM — C50.911 INVASIVE DUCTAL CARCINOMA OF RIGHT BREAST: Status: ACTIVE | Noted: 2025-06-19

## 2025-06-20 RX ORDER — HEPARIN SODIUM 5000 [USP'U]/ML
5000 INJECTION, SOLUTION INTRAVENOUS; SUBCUTANEOUS EVERY 8 HOURS SCHEDULED
OUTPATIENT
Start: 2025-06-20

## 2025-06-20 RX ORDER — SODIUM CHLORIDE 0.9 % (FLUSH) 0.9 %
3 SYRINGE (ML) INJECTION EVERY 12 HOURS SCHEDULED
OUTPATIENT
Start: 2025-06-20

## 2025-06-20 RX ORDER — SODIUM CHLORIDE 0.9 % (FLUSH) 0.9 %
10 SYRINGE (ML) INJECTION AS NEEDED
OUTPATIENT
Start: 2025-06-20

## 2025-06-20 RX ORDER — SODIUM CHLORIDE 9 MG/ML
40 INJECTION, SOLUTION INTRAVENOUS AS NEEDED
OUTPATIENT
Start: 2025-06-20

## 2025-06-20 NOTE — PROGRESS NOTES
Date of Service: 6/20/2025    Subjective   Kassandra Acevedo is a 60 y.o. female is being in consultation today at the request of Isacc Ruvalcaba MD    Chief Complaint: Right breast cancer    Kassandra Acevedo is a 60 y.o. female who presents with right breast cancer.  She noticed abnormal feeling in her right breast and presented for a mammogram.  She was found to have a right breast mass and underwent a biopsy of this area.  Biopsy returned with invasive ductal carcinoma.  Axillary ultrasound showed no evidence of lymphadenopathy.    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    GERD (gastroesophageal reflux disease)     Night terrors     Panic disorder     Psychosis     PTSD (post-traumatic stress disorder)        Past Surgical History:   Procedure Laterality Date    BREAST BIOPSY Left 2008    benign    BREAST SURGERY      THYROID CYST EXCISION           Family History   Problem Relation Age of Onset    Anxiety disorder Mother     Depression Mother     Alcohol abuse Mother     Schizophrenia Maternal Aunt     Breast cancer Neg Hx          Social History     Socioeconomic History    Marital status:    Tobacco Use    Smoking status: Every Day     Current packs/day: 1.00     Average packs/day: 1 pack/day for 33.0 years (33.0 ttl pk-yrs)     Types: Cigarettes    Smokeless tobacco: Never    Tobacco comments:     started smoking at 21 years old   Vaping Use    Vaping status: Never Used   Substance and Sexual Activity    Alcohol use: Yes     Comment: liquor daily    Drug use: No    Sexual activity: Yes     Partners: Male          Review of Systems   Pertinent items are noted in HPI     Constitutional: No fevers, chills or malaise. No unintentional weight loss   Eyes: Denies visual changes    Cardiovascular: Denies chest pain, palpitations   Respiratory: Denies cough or shortness of  "breath   Abdominal/Gastrointestinal: No abdominal pain, no nausea/vomiting   Genitourinary: Denies dysuria or hematuria   Musculoskeletal: Denies any chronic joint aches, pains or deformities              Skin: No lesions or rashes   Psychiatric: No recent mood changes   Neurologic: No paresthesias or loss of function        Objective       Physical Exam:      06/19/25  1125   Weight: 52.2 kg (115 lb)    Body mass index is 19.73 kg/m².  Constitution: Ht 162.6 cm (64.02\")   Wt 52.2 kg (115 lb)   BMI 19.73 kg/m²  . No acute distress  Head: Normocephalic, atraumatic.   Eyes: Aligned without strabismus. Conjunctivae noninjected   Ears, Nose, Mouth:no lesions noted  CV: Regular rate and rhythm   Respiratory: Symmetric chest expansion. No respiratory distress.   Gastrointestinal:  Soft, nontender, nondistended   Skin:  No cyanosis, clubbing or edema bilaterally  Neurologic: No gross deficits.  Alert and oriented x3  Psychiatric: Normal mood  Left breast: No palpable abnormality, no axillary lymphadenopathy  Right breast: Palpable 2.5 cm lesion present in the right lower quadrant of the right breast.  No palpable axillary lymphadenopathy.        Assessment   Diagnoses and all orders for this visit:    1. Invasive ductal carcinoma of right breast (Primary)  -     Case Request; Standing  -     Follow Anesthesia Guidelines / Protocol; Future  -     Follow Anesthesia Guidelines / Protocol; Standing  -     Verify / Perform Chlorhexidine Skin Prep; Standing  -     Notify Physician - Standard; Standing  -     Instructions on coughing, deep breathing, and incentive spirometry.; Standing  -     Insert Peripheral IV; Standing  -     Saline Lock & Maintain IV Access; Standing  -     sodium chloride 0.9 % flush 3 mL  -     sodium chloride 0.9 % flush 10 mL  -     sodium chloride 0.9 % infusion 40 mL  -     Place Sequential Compression Device; Standing  -     Maintain Sequential Compression Device; Standing  -     ceFAZolin 2000 mg " IVPB in 100 mL NS (VTB)  -     heparin (porcine) 5000 UNIT/ML injection 5,000 Units  -     Case Request  -     NM [place] Node Injection Only; Future      Kassandra Acevedo is a 60 y.o. female with right breast invasive ductal carcinoma.  We discussed all of the surgical options.  She is elected to proceed with a right breast lumpectomy and sentinel lymph node biopsy with postoperative radiation and hormone therapy.  Her mass is palpable and she will not require preoperative needle localization.  She will still need a sentinel node injection.    Plan to perform on July 2.           Franci Keating MD  [facility]

## 2025-06-20 NOTE — H&P (VIEW-ONLY)
Date of Service: 6/20/2025    Subjective   Kassandra Acevedo is a 60 y.o. female is being in consultation today at the request of Isacc Ruvalcaba MD    Chief Complaint: Right breast cancer    Kassandra Acevedo is a 60 y.o. female who presents with right breast cancer.  She noticed abnormal feeling in her right breast and presented for a mammogram.  She was found to have a right breast mass and underwent a biopsy of this area.  Biopsy returned with invasive ductal carcinoma.  Axillary ultrasound showed no evidence of lymphadenopathy.    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    GERD (gastroesophageal reflux disease)     Night terrors     Panic disorder     Psychosis     PTSD (post-traumatic stress disorder)        Past Surgical History:   Procedure Laterality Date    BREAST BIOPSY Left 2008    benign    BREAST SURGERY      THYROID CYST EXCISION           Family History   Problem Relation Age of Onset    Anxiety disorder Mother     Depression Mother     Alcohol abuse Mother     Schizophrenia Maternal Aunt     Breast cancer Neg Hx          Social History     Socioeconomic History    Marital status:    Tobacco Use    Smoking status: Every Day     Current packs/day: 1.00     Average packs/day: 1 pack/day for 33.0 years (33.0 ttl pk-yrs)     Types: Cigarettes    Smokeless tobacco: Never    Tobacco comments:     started smoking at 21 years old   Vaping Use    Vaping status: Never Used   Substance and Sexual Activity    Alcohol use: Yes     Comment: liquor daily    Drug use: No    Sexual activity: Yes     Partners: Male          Review of Systems   Pertinent items are noted in HPI     Constitutional: No fevers, chills or malaise. No unintentional weight loss   Eyes: Denies visual changes    Cardiovascular: Denies chest pain, palpitations   Respiratory: Denies cough or shortness of  "breath   Abdominal/Gastrointestinal: No abdominal pain, no nausea/vomiting   Genitourinary: Denies dysuria or hematuria   Musculoskeletal: Denies any chronic joint aches, pains or deformities              Skin: No lesions or rashes   Psychiatric: No recent mood changes   Neurologic: No paresthesias or loss of function        Objective       Physical Exam:      06/19/25  1125   Weight: 52.2 kg (115 lb)    Body mass index is 19.73 kg/m².  Constitution: Ht 162.6 cm (64.02\")   Wt 52.2 kg (115 lb)   BMI 19.73 kg/m²  . No acute distress  Head: Normocephalic, atraumatic.   Eyes: Aligned without strabismus. Conjunctivae noninjected   Ears, Nose, Mouth:no lesions noted  CV: Regular rate and rhythm   Respiratory: Symmetric chest expansion. No respiratory distress.   Gastrointestinal:  Soft, nontender, nondistended   Skin:  No cyanosis, clubbing or edema bilaterally  Neurologic: No gross deficits.  Alert and oriented x3  Psychiatric: Normal mood  Left breast: No palpable abnormality, no axillary lymphadenopathy  Right breast: Palpable 2.5 cm lesion present in the right lower quadrant of the right breast.  No palpable axillary lymphadenopathy.        Assessment   Diagnoses and all orders for this visit:    1. Invasive ductal carcinoma of right breast (Primary)  -     Case Request; Standing  -     Follow Anesthesia Guidelines / Protocol; Future  -     Follow Anesthesia Guidelines / Protocol; Standing  -     Verify / Perform Chlorhexidine Skin Prep; Standing  -     Notify Physician - Standard; Standing  -     Instructions on coughing, deep breathing, and incentive spirometry.; Standing  -     Insert Peripheral IV; Standing  -     Saline Lock & Maintain IV Access; Standing  -     sodium chloride 0.9 % flush 3 mL  -     sodium chloride 0.9 % flush 10 mL  -     sodium chloride 0.9 % infusion 40 mL  -     Place Sequential Compression Device; Standing  -     Maintain Sequential Compression Device; Standing  -     ceFAZolin 2000 mg " IVPB in 100 mL NS (VTB)  -     heparin (porcine) 5000 UNIT/ML injection 5,000 Units  -     Case Request  -     NM [place] Node Injection Only; Future      Kassandra Acevedo is a 60 y.o. female with right breast invasive ductal carcinoma.  We discussed all of the surgical options.  She is elected to proceed with a right breast lumpectomy and sentinel lymph node biopsy with postoperative radiation and hormone therapy.  Her mass is palpable and she will not require preoperative needle localization.  She will still need a sentinel node injection.    Plan to perform on July 2.           Franci Keating MD  [facility]

## 2025-06-25 DIAGNOSIS — C50.911 INVASIVE DUCTAL CARCINOMA OF RIGHT BREAST: Primary | ICD-10-CM

## 2025-06-27 NOTE — DISCHARGE INSTRUCTIONS
Please discontinue all blood thinners and anticoagulants (except aspirin) prior to surgery as per your surgeon and cardiologist instructions.  Aspirin may be continued up to the day prior to surgery.    HOLD all diabetic medications the morning of surgery as order by physician.    Please follow instructions on use of prep cloths provided by nurse. Return instruction sheet to pre-op nurse on day of surgery.    Arrival time for surgery on  7/2/25 will be given to you by Dr. Arellano's Office.(1100 AM)    A RESPONSIBLE PERSON MUST REMAIN IN THE WAITING ROOM DURING YOUR PROCEDURE AND A RESPONSIBLE  MUST BE AVAILABLE UPON YOUR DISCHARGE.    General Instructions:  Do NOT eat or drink after midnight 7/1/25 which includes water, mints, or gum.  You may brush your teeth. Dental appliances that are removable must be taken out day of surgery.  Do NOT smoke, chew tobacco, or drink alcohol within 24 hours prior to surgery.  Bring medications in original bottles, any inhalers and if applicable your C-PAP/BI-PAP machine  Bring any papers given to you in the doctor’s office  Wear clean, comfortable clothes and socks  Do NOT wear contact lenses or make-up or dark nail polish.  Bring a case for your glasses if applicable.  Bring crutches or walker if applicable  Leave all other valuables and jewelry at home  If you were given a blood bank armband, continue to wear it until discharged.    Preventing a Surgical Site Infection:  Shower the night before surgery (unless instructed otherwise) using a fresh bar of anti-bacterial soap (such as Dial) and clean washcloth.  Dry with a clean towel and dress in clean clothing.  For 2 to 3 days before surgery, avoid shaving with a razor near where you will have surgery because the razor can irritate skin and make it easier to develop an infection.  Ask your surgeon if you will be receiving antibiotics prior to surgery.  Make sure you, your family, and all healthcare providers clean their  hands with soap and water or an alcohol-based hand  before caring for you or your wound.  If at all possible, quit smoking as many days before surgery as you can.    Day of Surgery:  Upon arrival, a pre-op nurse and anesthesiologist will review your health history, obtain vital signs, and answer questions you may have.  The only belongings needed at this time will be your home medications and if applicable you C-PAP/BI-PAP machine.  If you are staying overnight, your family can leave the rest of your belongings in the car and bring them to your room later.  A pre-op nurse will start an IV and you may receive medication in preparation for surgery.  Due to patient privacy and limited space, only one member of your family will be able to accompany you in the pre-op area.  While you are in surgery your family should notify the waiting room  if they leave the waiting room area and provide a contact number.  Please be aware that surgery does come with discomfort.  We want to make every effort to control your discomfort so please discuss any uncontrolled symptoms with your nurse.  Your doctor will most likely have prescribed pain medications.  If you are going home after surgery you will receive individualized written care instructions before being discharged.  A responsible adult must drive you to and from the hospital on the day of surgery and stay with you for 24 hours.  If you are staying overnight following surgery, you will be transported to your hospital room following the recovery period.

## 2025-06-30 ENCOUNTER — APPOINTMENT (OUTPATIENT)
Dept: NUCLEAR MEDICINE | Facility: HOSPITAL | Age: 60
End: 2025-06-30
Payer: MEDICAID

## 2025-06-30 ENCOUNTER — PRE-ADMISSION TESTING (OUTPATIENT)
Dept: PREADMISSION TESTING | Facility: HOSPITAL | Age: 60
End: 2025-06-30
Payer: MEDICAID

## 2025-06-30 DIAGNOSIS — C50.911 INVASIVE DUCTAL CARCINOMA OF RIGHT BREAST: ICD-10-CM

## 2025-06-30 LAB
ANION GAP SERPL CALCULATED.3IONS-SCNC: 13.1 MMOL/L (ref 5–15)
BUN SERPL-MCNC: 9.8 MG/DL (ref 6–20)
BUN/CREAT SERPL: 14.4 (ref 7–25)
CALCIUM SPEC-SCNC: 9.4 MG/DL (ref 8.6–10.5)
CHLORIDE SERPL-SCNC: 104 MMOL/L (ref 98–107)
CO2 SERPL-SCNC: 25.9 MMOL/L (ref 22–29)
CREAT SERPL-MCNC: 0.68 MG/DL (ref 0.57–1)
DEPRECATED RDW RBC AUTO: 41.7 FL (ref 37–54)
EGFRCR SERPLBLD CKD-EPI 2021: 100.5 ML/MIN/1.73
ERYTHROCYTE [DISTWIDTH] IN BLOOD BY AUTOMATED COUNT: 12.1 % (ref 12.3–15.4)
GLUCOSE SERPL-MCNC: 92 MG/DL (ref 65–99)
HCT VFR BLD AUTO: 40.2 % (ref 34–46.6)
HGB BLD-MCNC: 13.1 G/DL (ref 12–15.9)
MCH RBC QN AUTO: 30.5 PG (ref 26.6–33)
MCHC RBC AUTO-ENTMCNC: 32.6 G/DL (ref 31.5–35.7)
MCV RBC AUTO: 93.5 FL (ref 79–97)
PLATELET # BLD AUTO: 306 10*3/MM3 (ref 140–450)
PMV BLD AUTO: 10.2 FL (ref 6–12)
POTASSIUM SERPL-SCNC: 4.6 MMOL/L (ref 3.5–5.2)
RBC # BLD AUTO: 4.3 10*6/MM3 (ref 3.77–5.28)
SODIUM SERPL-SCNC: 143 MMOL/L (ref 136–145)
WBC NRBC COR # BLD AUTO: 5.82 10*3/MM3 (ref 3.4–10.8)

## 2025-06-30 PROCEDURE — 80048 BASIC METABOLIC PNL TOTAL CA: CPT

## 2025-06-30 PROCEDURE — 36415 COLL VENOUS BLD VENIPUNCTURE: CPT

## 2025-06-30 PROCEDURE — 85027 COMPLETE CBC AUTOMATED: CPT

## 2025-07-02 ENCOUNTER — ANESTHESIA (OUTPATIENT)
Dept: PERIOP | Facility: HOSPITAL | Age: 60
End: 2025-07-02
Payer: MEDICAID

## 2025-07-02 ENCOUNTER — ANESTHESIA EVENT (OUTPATIENT)
Dept: PERIOP | Facility: HOSPITAL | Age: 60
End: 2025-07-02
Payer: MEDICAID

## 2025-07-02 ENCOUNTER — HOSPITAL ENCOUNTER (OUTPATIENT)
Dept: NUCLEAR MEDICINE | Facility: HOSPITAL | Age: 60
Setting detail: HOSPITAL OUTPATIENT SURGERY
Discharge: HOME | End: 2025-07-02
Payer: MEDICAID

## 2025-07-02 ENCOUNTER — APPOINTMENT (OUTPATIENT)
Dept: MAMMOGRAPHY | Facility: HOSPITAL | Age: 60
End: 2025-07-02
Payer: MEDICAID

## 2025-07-02 ENCOUNTER — HOSPITAL ENCOUNTER (OUTPATIENT)
Facility: HOSPITAL | Age: 60
Setting detail: HOSPITAL OUTPATIENT SURGERY
Discharge: HOME | End: 2025-07-02
Attending: SURGERY | Admitting: SURGERY
Payer: MEDICAID

## 2025-07-02 VITALS
TEMPERATURE: 97.8 F | BODY MASS INDEX: 19.09 KG/M2 | OXYGEN SATURATION: 99 % | WEIGHT: 111.8 LBS | SYSTOLIC BLOOD PRESSURE: 160 MMHG | RESPIRATION RATE: 18 BRPM | DIASTOLIC BLOOD PRESSURE: 81 MMHG | HEART RATE: 67 BPM | HEIGHT: 64 IN

## 2025-07-02 DIAGNOSIS — C50.911 INVASIVE DUCTAL CARCINOMA OF RIGHT BREAST: ICD-10-CM

## 2025-07-02 PROCEDURE — A9541 TC99M SULFUR COLLOID: HCPCS | Performed by: SURGERY

## 2025-07-02 PROCEDURE — 38900 IO MAP OF SENT LYMPH NODE: CPT | Performed by: SURGERY

## 2025-07-02 PROCEDURE — 25010000002 MIDAZOLAM PER 1 MG: Performed by: ANESTHESIOLOGY

## 2025-07-02 PROCEDURE — 34310000005 TECHNETIUM FILTERED SULFUR COLLOID: Performed by: SURGERY

## 2025-07-02 PROCEDURE — 76098 X-RAY EXAM SURGICAL SPECIMEN: CPT

## 2025-07-02 PROCEDURE — 38792 RA TRACER ID OF SENTINL NODE: CPT

## 2025-07-02 PROCEDURE — 25010000002 HEPARIN (PORCINE) PER 1000 UNITS: Performed by: SURGERY

## 2025-07-02 PROCEDURE — 25010000002 BUPIVACAINE 0.5 % SOLUTION: Performed by: SURGERY

## 2025-07-02 PROCEDURE — 38525 BIOPSY/REMOVAL LYMPH NODES: CPT | Performed by: SURGERY

## 2025-07-02 PROCEDURE — 25010000002 FENTANYL CITRATE (PF) 50 MCG/ML SOLUTION: Performed by: NURSE ANESTHETIST, CERTIFIED REGISTERED

## 2025-07-02 PROCEDURE — 25810000003 LACTATED RINGERS PER 1000 ML: Performed by: NURSE ANESTHETIST, CERTIFIED REGISTERED

## 2025-07-02 PROCEDURE — 25010000002 ONDANSETRON PER 1 MG: Performed by: NURSE ANESTHETIST, CERTIFIED REGISTERED

## 2025-07-02 PROCEDURE — 25010000002 ISOSULFAN BLUE 1 % SOLUTION: Performed by: SURGERY

## 2025-07-02 PROCEDURE — 25010000002 CEFAZOLIN PER 500 MG: Performed by: SURGERY

## 2025-07-02 PROCEDURE — 25010000002 DEXAMETHASONE PER 1 MG: Performed by: NURSE ANESTHETIST, CERTIFIED REGISTERED

## 2025-07-02 PROCEDURE — 38792 RA TRACER ID OF SENTINL NODE: CPT | Performed by: SURGERY

## 2025-07-02 PROCEDURE — 25810000003 LACTATED RINGERS PER 1000 ML: Performed by: ANESTHESIOLOGY

## 2025-07-02 PROCEDURE — 19301 PARTIAL MASTECTOMY: CPT | Performed by: SURGERY

## 2025-07-02 PROCEDURE — 76098 X-RAY EXAM SURGICAL SPECIMEN: CPT | Performed by: RADIOLOGY

## 2025-07-02 PROCEDURE — 25010000002 PROPOFOL 200 MG/20ML EMULSION: Performed by: NURSE ANESTHETIST, CERTIFIED REGISTERED

## 2025-07-02 RX ORDER — FENTANYL CITRATE 50 UG/ML
50 INJECTION, SOLUTION INTRAMUSCULAR; INTRAVENOUS
Status: DISCONTINUED | OUTPATIENT
Start: 2025-07-02 | End: 2025-07-02 | Stop reason: HOSPADM

## 2025-07-02 RX ORDER — SODIUM CHLORIDE 0.9 % (FLUSH) 0.9 %
10 SYRINGE (ML) INJECTION AS NEEDED
Status: DISCONTINUED | OUTPATIENT
Start: 2025-07-02 | End: 2025-07-02 | Stop reason: HOSPADM

## 2025-07-02 RX ORDER — OXYCODONE HYDROCHLORIDE 5 MG/1
5 TABLET ORAL EVERY 4 HOURS PRN
Qty: 20 TABLET | Refills: 0 | Status: SHIPPED | OUTPATIENT
Start: 2025-07-02 | End: 2026-07-02

## 2025-07-02 RX ORDER — ONDANSETRON 2 MG/ML
4 INJECTION INTRAMUSCULAR; INTRAVENOUS AS NEEDED
Status: DISCONTINUED | OUTPATIENT
Start: 2025-07-02 | End: 2025-07-02 | Stop reason: HOSPADM

## 2025-07-02 RX ORDER — DEXAMETHASONE SODIUM PHOSPHATE 4 MG/ML
INJECTION, SOLUTION INTRA-ARTICULAR; INTRALESIONAL; INTRAMUSCULAR; INTRAVENOUS; SOFT TISSUE AS NEEDED
Status: DISCONTINUED | OUTPATIENT
Start: 2025-07-02 | End: 2025-07-02 | Stop reason: SURG

## 2025-07-02 RX ORDER — SODIUM CHLORIDE 9 MG/ML
40 INJECTION, SOLUTION INTRAVENOUS AS NEEDED
Status: DISCONTINUED | OUTPATIENT
Start: 2025-07-02 | End: 2025-07-02 | Stop reason: HOSPADM

## 2025-07-02 RX ORDER — SODIUM CHLORIDE 0.9 % (FLUSH) 0.9 %
10 SYRINGE (ML) INJECTION EVERY 12 HOURS SCHEDULED
Status: DISCONTINUED | OUTPATIENT
Start: 2025-07-02 | End: 2025-07-02 | Stop reason: HOSPADM

## 2025-07-02 RX ORDER — SODIUM CHLORIDE, SODIUM LACTATE, POTASSIUM CHLORIDE, CALCIUM CHLORIDE 600; 310; 30; 20 MG/100ML; MG/100ML; MG/100ML; MG/100ML
125 INJECTION, SOLUTION INTRAVENOUS ONCE
Status: COMPLETED | OUTPATIENT
Start: 2025-07-02 | End: 2025-07-02

## 2025-07-02 RX ORDER — ISOSULFAN BLUE 50 MG/5ML
INJECTION, SOLUTION SUBCUTANEOUS AS NEEDED
Status: DISCONTINUED | OUTPATIENT
Start: 2025-07-02 | End: 2025-07-02 | Stop reason: HOSPADM

## 2025-07-02 RX ORDER — IPRATROPIUM BROMIDE AND ALBUTEROL SULFATE 2.5; .5 MG/3ML; MG/3ML
3 SOLUTION RESPIRATORY (INHALATION) ONCE AS NEEDED
Status: DISCONTINUED | OUTPATIENT
Start: 2025-07-02 | End: 2025-07-02 | Stop reason: HOSPADM

## 2025-07-02 RX ORDER — MEPERIDINE HYDROCHLORIDE 25 MG/ML
12.5 INJECTION INTRAMUSCULAR; INTRAVENOUS; SUBCUTANEOUS
Status: DISCONTINUED | OUTPATIENT
Start: 2025-07-02 | End: 2025-07-02 | Stop reason: HOSPADM

## 2025-07-02 RX ORDER — SODIUM CHLORIDE 0.9 % (FLUSH) 0.9 %
3 SYRINGE (ML) INJECTION EVERY 12 HOURS SCHEDULED
Status: DISCONTINUED | OUTPATIENT
Start: 2025-07-02 | End: 2025-07-02 | Stop reason: HOSPADM

## 2025-07-02 RX ORDER — SCOPOLAMINE 1 MG/3D
1 PATCH, EXTENDED RELEASE TRANSDERMAL ONCE
Status: DISCONTINUED | OUTPATIENT
Start: 2025-07-02 | End: 2025-07-02 | Stop reason: HOSPADM

## 2025-07-02 RX ORDER — SODIUM CHLORIDE, SODIUM LACTATE, POTASSIUM CHLORIDE, CALCIUM CHLORIDE 600; 310; 30; 20 MG/100ML; MG/100ML; MG/100ML; MG/100ML
100 INJECTION, SOLUTION INTRAVENOUS ONCE AS NEEDED
Status: DISCONTINUED | OUTPATIENT
Start: 2025-07-02 | End: 2025-07-02 | Stop reason: HOSPADM

## 2025-07-02 RX ORDER — HEPARIN SODIUM 5000 [USP'U]/ML
5000 INJECTION, SOLUTION INTRAVENOUS; SUBCUTANEOUS EVERY 8 HOURS SCHEDULED
Status: DISCONTINUED | OUTPATIENT
Start: 2025-07-02 | End: 2025-07-02 | Stop reason: HOSPADM

## 2025-07-02 RX ORDER — ONDANSETRON 2 MG/ML
INJECTION INTRAMUSCULAR; INTRAVENOUS AS NEEDED
Status: DISCONTINUED | OUTPATIENT
Start: 2025-07-02 | End: 2025-07-02 | Stop reason: SURG

## 2025-07-02 RX ORDER — MIDAZOLAM HYDROCHLORIDE 1 MG/ML
1 INJECTION, SOLUTION INTRAMUSCULAR; INTRAVENOUS
Status: DISCONTINUED | OUTPATIENT
Start: 2025-07-02 | End: 2025-07-02 | Stop reason: HOSPADM

## 2025-07-02 RX ORDER — KETOROLAC TROMETHAMINE 30 MG/ML
30 INJECTION, SOLUTION INTRAMUSCULAR; INTRAVENOUS EVERY 6 HOURS PRN
Status: DISCONTINUED | OUTPATIENT
Start: 2025-07-02 | End: 2025-07-02 | Stop reason: HOSPADM

## 2025-07-02 RX ORDER — SODIUM CHLORIDE, SODIUM LACTATE, POTASSIUM CHLORIDE, CALCIUM CHLORIDE 600; 310; 30; 20 MG/100ML; MG/100ML; MG/100ML; MG/100ML
INJECTION, SOLUTION INTRAVENOUS CONTINUOUS PRN
Status: DISCONTINUED | OUTPATIENT
Start: 2025-07-02 | End: 2025-07-02 | Stop reason: SURG

## 2025-07-02 RX ORDER — PROPOFOL 10 MG/ML
INJECTION, EMULSION INTRAVENOUS AS NEEDED
Status: DISCONTINUED | OUTPATIENT
Start: 2025-07-02 | End: 2025-07-02 | Stop reason: SURG

## 2025-07-02 RX ORDER — FENTANYL CITRATE 50 UG/ML
INJECTION, SOLUTION INTRAMUSCULAR; INTRAVENOUS AS NEEDED
Status: DISCONTINUED | OUTPATIENT
Start: 2025-07-02 | End: 2025-07-02 | Stop reason: SURG

## 2025-07-02 RX ORDER — BUPIVACAINE HYDROCHLORIDE 5 MG/ML
INJECTION, SOLUTION PERINEURAL AS NEEDED
Status: DISCONTINUED | OUTPATIENT
Start: 2025-07-02 | End: 2025-07-02 | Stop reason: HOSPADM

## 2025-07-02 RX ADMIN — FENTANYL CITRATE 50 MCG: 50 INJECTION INTRAMUSCULAR; INTRAVENOUS at 13:23

## 2025-07-02 RX ADMIN — FENTANYL CITRATE 50 MCG: 50 INJECTION INTRAMUSCULAR; INTRAVENOUS at 13:31

## 2025-07-02 RX ADMIN — CEFAZOLIN 2 G: 2 INJECTION, POWDER, FOR SOLUTION INTRAMUSCULAR; INTRAVENOUS at 13:16

## 2025-07-02 RX ADMIN — PROPOFOL 100 MG: 10 INJECTION, EMULSION INTRAVENOUS at 13:09

## 2025-07-02 RX ADMIN — DEXAMETHASONE SODIUM PHOSPHATE 4 MG: 4 INJECTION, SOLUTION INTRA-ARTICULAR; INTRALESIONAL; INTRAMUSCULAR; INTRAVENOUS; SOFT TISSUE at 15:02

## 2025-07-02 RX ADMIN — ONDANSETRON HYDROCHLORIDE 4 MG: 2 SOLUTION INTRAMUSCULAR; INTRAVENOUS at 15:01

## 2025-07-02 RX ADMIN — HEPARIN SODIUM 5000 UNITS: 5000 INJECTION INTRAVENOUS; SUBCUTANEOUS at 11:34

## 2025-07-02 RX ADMIN — FENTANYL CITRATE 50 MCG: 50 INJECTION, SOLUTION INTRAMUSCULAR; INTRAVENOUS at 15:22

## 2025-07-02 RX ADMIN — SODIUM CHLORIDE, POTASSIUM CHLORIDE, SODIUM LACTATE AND CALCIUM CHLORIDE 125 ML/HR: 600; 310; 30; 20 INJECTION, SOLUTION INTRAVENOUS at 11:37

## 2025-07-02 RX ADMIN — MIDAZOLAM HYDROCHLORIDE 2 MG: 1 INJECTION, SOLUTION INTRAMUSCULAR; INTRAVENOUS at 13:05

## 2025-07-02 RX ADMIN — TECHNETIUM TC 99M SULFUR COLLOID 1 DOSE: KIT at 14:42

## 2025-07-02 RX ADMIN — SODIUM CHLORIDE, POTASSIUM CHLORIDE, SODIUM LACTATE AND CALCIUM CHLORIDE: 600; 310; 30; 20 INJECTION, SOLUTION INTRAVENOUS at 13:07

## 2025-07-02 RX ADMIN — SCOPOLAMINE 1 PATCH: 1.5 PATCH, EXTENDED RELEASE TRANSDERMAL at 11:39

## 2025-07-02 NOTE — ANESTHESIA PROCEDURE NOTES
Airway  Reason: elective    Date/Time: 7/2/2025 1:10 PM  Airway not difficult    General Information and Staff    Patient location during procedure: OR  CRNA/CAA: Tiny Estrada CRNA    Indications and Patient Condition  Indications for airway management: airway protection    Preoxygenated: yes    Mask difficulty assessment: 0 - not attempted    Final Airway Details    Final airway type: supraglottic airway      Successful airway: unique  Size: 4   Number of attempts at approach: 1  Assessment: lips, teeth, and gum same as pre-op

## 2025-07-02 NOTE — DISCHARGE INSTRUCTIONS
DISCHARGE INSTRUCTIONS    You may shower in 24 hours. It is important that you let soap and water run over your wound to keep it clean. Pat dry with clean towel. Wound does not need to be covered (you have stitches that will dissolve under your skin. You have surgical glue that will fall off on its own).  Do not soak in a tub or go in a pool/swim in water for 2 weeks.  Take over the counter acetaminophen (tylenol) or ibuprofen (advil/motrin) as needed for pain control. These medications may be alternated, follow the recommendations on the medication bottle. Take your prescribed narcotic pain medications as needed for additional pain control. (DO NOT DRIVE WHILE TAKING NARCOTIC PAIN MEDICATION)  Please notify the general surgery clinic should you develop redness, worsening drainage, fever, or increasing pain at your incision site.   Do not lift more than 15 pounds for 2 weeks.     Clinic contact information:  [facility]  [facility] Location: [phone]

## 2025-07-02 NOTE — ANESTHESIA POSTPROCEDURE EVALUATION
Patient: Kassandra Acevedo    Procedure Summary       Date: 07/02/25 Room / Location: [facility] OR 03 /  [facility] OR    Anesthesia Start: 1306 Anesthesia Stop: 1509    Procedure: BREAST LUMPECTOMY WITH SENTINEL NODE BIOPSY (Right: Breast) Diagnosis:       Invasive ductal carcinoma of right breast      (Invasive ductal carcinoma of right breast [C50.911])    Surgeons: Franci Arellano MD Provider: Sam Helms MD    Anesthesia Type: general ASA Status: 3            Anesthesia Type: general    Vitals  Vitals Value Taken Time   /79 07/02/25 15:43   Temp 97.6 °F (36.4 °C) 07/02/25 15:13   Pulse 68 07/02/25 15:43   Resp 18 07/02/25 15:43   SpO2 96 % 07/02/25 15:43           Post Anesthesia Care and Evaluation    Patient location during evaluation: PACU  Patient participation: complete - patient participated  Level of consciousness: awake  Pain score: 2  Pain management: adequate    Airway patency: patent  Anesthetic complications: No anesthetic complications  PONV Status: controlled  Cardiovascular status: acceptable and blood pressure returned to baseline  Respiratory status: acceptable and room air  Hydration status: acceptable    Comments: Patient comfortable with discharge at this time.

## 2025-07-03 NOTE — OP NOTE
OPERATIVE NOTE    Patient Name:  Kassandra Acevedo  YOB: 1965  [id number]    Date of Surgery:  7/2/25    PREOPERATIVE DIAGNOSIS: Right breast invasive ductal carcinoma    POSTOPERATIVE DIAGNOSIS: Same      PROCEDURE PERFORMED: Right Breast lumpectomy and [place] Lymph Node Biopsy of the deep axilla     SURGEON: Franci Arellano MD     Circulator: Dariela Hoffman RN  Scrub Person: Gerson Chahal; Adriana Witt; Aracelis Hastings CSFA  Assistant: Esdras Syed CSFA; Jagruti Lopez CSA; Naty Bailey     Assistant: Esdras Syed CSFA; Jagruti Lopez CSA; Naty Bailey    SPECIMENS: Right Breast Lumpectomy and [place] Node    EBL: 20     ANESTHESIA: General.      FINDINGS:   1. Right breast lumpectomy specimen  2. [place] lymph node      INDICATIONS: The patient is a 60 y.o. female who presented to clinic after diagnosis with right breast invasive ductal carcinoma.  Discussion of surgical options was had including breast conservation therapy to include a lumpectomy and sentinel lymph node biopsy followed by radiation or mastectomy and sentinel lymph node biopsy.  After the discussion, the patient elected for breast conservation therapy with a right breast lumpectomy and sentinel lymph node biopsy followed by radiation.    DESCRIPTION OF PROCEDURE:   The patient was appropriately identified in the PACU and brought to the operating room.  She was placed supine on the table.  She underwent general anesthesia.   A time out was performed and perioperative antibiotics were given. I injected the right retroareolar space with 1mL of blue dye and 0.5 mL of indocyanine green at the 12 o'clock position of the breast in the 9 o'clock position of the breast.  The breast was then massaged the breast for 5 minutes to encourage lymphatic flow.  The right breast and axilla were prepped and draped in the normal sterile fashion.  She had a palpable lesion at the 6 o'clock position of the  breast.  A periareolar incision was made and I dissected down using Bovie electrocautery to the area of the breast mass.  This was widely excised.  The specimen was then sent to the breast center to confirm that the clip was in the specimen.  Once confirmed, I ensured hemostasis within the lumpectomy site and began with closure of the wound using a 3-0 Vicryl suture and the skin was closed with a 4-0 Monocryl and surgical glue.  The indocyanine green was not adequately mapping to the axilla, I then injected nuclear medicine tracer within the periareolar region.  I then turned my attention to the axilla which was the area of increased activity by the [place] counter.  In axillary incision was made overlying the area of the increased activity.  I dissected down and opened the clavipectoral fascia, thus entering the deep space of the axilla.  I identified the sentinel nodes as blue and/or with increased activity by the [place] counter.  [place] node number one was blue with an ex vivo count of 240, sentinel node #2 was blue with an ex vivo count of 3730.   The background count was 200.   I passed the sentinel lymph nodes to pathology for permanent.  I copiously irrigated the wound and obtained hemostasis.  I then closed with 3-0 Vicryl deep dermal interrupted and 4-0 Monocryl running subcuticular.  Surgical glue was placed an allowed to dry. Then fluffs and an ace wrap were applied as dressing. At the conclusion of the case, all counts were correct and hemostasis was achieved.    COMPLICATIONS: None     Franci Arellano MD  7/3/2025  15:33 EDT

## 2025-07-09 LAB — REF LAB TEST METHOD: NORMAL

## 2025-07-10 ENCOUNTER — OFFICE VISIT (OUTPATIENT)
Age: 60
End: 2025-07-10
Payer: MEDICAID

## 2025-07-10 VITALS — HEIGHT: 64 IN | WEIGHT: 111 LBS | BODY MASS INDEX: 18.95 KG/M2

## 2025-07-10 DIAGNOSIS — I89.9 LYMPH LEAK: Primary | ICD-10-CM

## 2025-07-10 PROCEDURE — 99024 POSTOP FOLLOW-UP VISIT: CPT | Performed by: SURGERY

## 2025-07-10 RX ORDER — OXYCODONE HYDROCHLORIDE 5 MG/1
5 TABLET ORAL EVERY 4 HOURS PRN
Qty: 20 TABLET | Refills: 0 | Status: SHIPPED | OUTPATIENT
Start: 2025-07-10

## 2025-07-11 NOTE — PROGRESS NOTES
"Patient Name:  Kassandra Acevedo  YOB: 1965  [id number]    Follow Up Note- Post Op    Date of visit: 7/10/25    Subjective   Subjective:   Presents today for post operative evaluation. Reports swelling in her right axilla.        Objective     Objective:     Ht 162.6 cm (64.02\")   Wt 50.3 kg (111 lb)   BMI 19.04 kg/m²       CV:  Regular rate and rhythm  L:  Bilateral lung rise and fall, no use of accessory muscles   Abd:  Soft, nontender, nondistended.   Ext:  No cyanosis, clubbing, edema  Right axillary swelling     Assessment/ Plan:  Assessment   Diagnoses and all orders for this visit:    1. Lymph leak (Primary)  -     oxyCODONE (Roxicodone) 5 MG immediate release tablet; Take 1 tablet by mouth Every 4 (Four) Hours As Needed for Moderate Pain.  Dispense: 20 tablet; Refill: 0      Presents for a post operative evaluation today. Has lymphatic buildup in right axilla. Wrapped tightly with ace, will see her back tomorrow to evaluate for improvement.     Franci Keating MD       General Surgeon  [facility]       "

## 2025-07-17 ENCOUNTER — OFFICE VISIT (OUTPATIENT)
Age: 60
End: 2025-07-17
Payer: MEDICAID

## 2025-07-17 VITALS — BODY MASS INDEX: 18.95 KG/M2 | WEIGHT: 111 LBS | HEIGHT: 64 IN

## 2025-07-17 DIAGNOSIS — Z85.3 HISTORY OF RIGHT BREAST CANCER: Primary | ICD-10-CM

## 2025-07-17 PROCEDURE — 99024 POSTOP FOLLOW-UP VISIT: CPT | Performed by: SURGERY

## 2025-07-17 PROCEDURE — 1159F MED LIST DOCD IN RCRD: CPT | Performed by: SURGERY

## 2025-07-17 PROCEDURE — 1160F RVW MEDS BY RX/DR IN RCRD: CPT | Performed by: SURGERY

## 2025-07-17 NOTE — LETTER
July 17, 2025     Patient: Kassandra Acevedo   YOB: 1965   Date of Visit: 7/17/2025       To Whom It May Concern:    It is my medical opinion that Kassandra Acevedo may return to full duty immediately with no restrictions.        Sincerely,            Franci Arellano MD    CC: No Recipients

## 2025-07-21 NOTE — PROGRESS NOTES
"Patient Name:  Kassandra Acevedo  YOB: 1965  [id number]    Follow Up Note    Date of visit: 7/17/25    Subjective   Subjective: Presents for follow-up after right breast lumpectomy and sentinel lymph node biopsy complicated by seroma.  This was percutaneously drained.  She reports doing very well now.         Objective     Objective:     Ht 162.6 cm (64.02\")   Wt 50.3 kg (111 lb)   BMI 19.04 kg/m²       CV:  Regular rate and rhythm  L:  Bilateral lung rise and fall, no use of accessory muscles   Abd:  Soft, nontender, nondistended  Ext:  No cyanosis, clubbing, edema      Assessment/ Plan:  Assessment   Diagnoses and all orders for this visit:    1. History of right breast cancer (Primary)  -     Ambulatory Referral to Oncology    Right breast invasive ductal carcinoma: pT1c pN0 pMn/a   Ms. Acevedo underwent a right breast lumpectomy and sentinel lymph node biopsy with subsequent seroma formation.  She is doing very well now.  I we will refer her to both oncology for adjuvant hormonal therapy as well as radiation oncology to initiate radiation therapy treatments.    Franci Keating MD       General Surgeon  [facility]       "

## 2025-07-23 ENCOUNTER — CONSULT (OUTPATIENT)
Dept: ONCOLOGY | Facility: CLINIC | Age: 60
End: 2025-07-23
Payer: MEDICAID

## 2025-07-23 ENCOUNTER — LAB (OUTPATIENT)
Dept: ONCOLOGY | Facility: CLINIC | Age: 60
End: 2025-07-23
Payer: MEDICAID

## 2025-07-23 VITALS
DIASTOLIC BLOOD PRESSURE: 71 MMHG | BODY MASS INDEX: 19.63 KG/M2 | OXYGEN SATURATION: 99 % | SYSTOLIC BLOOD PRESSURE: 123 MMHG | TEMPERATURE: 97.7 F | HEIGHT: 64 IN | WEIGHT: 115 LBS | HEART RATE: 73 BPM | RESPIRATION RATE: 18 BRPM

## 2025-07-23 DIAGNOSIS — C50.911 INVASIVE DUCTAL CARCINOMA OF RIGHT BREAST: Primary | ICD-10-CM

## 2025-07-23 NOTE — PROGRESS NOTES
Name:  Kassandra Acevedo  :  1965  Date:  2025     REFERRING PHYSICIAN  Franci Arellano MD    PRIMARY CARE PHYSICIAN  Isacc Ruvalcaba MD    REASON FOR CONSULTATION  1. Invasive ductal carcinoma of right breast      CHIEF COMPLAINT  None.    Dear Dr. Arellano,    HISTORY OF PRESENT ILLNESS:   Thank you for referring Ms. Acevedo to our medical oncology clinic. 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 She had noticed an abnormal feeling in her right breast and underwent a mammogram on 2025. A subsequent biopsy of the 1.6 cm mass in the right 6:00 region that this study identified was consistent with an ER strongly positive (100%), SD negative (0%), HER/rodolfo negative (0+ by IHC), invasive, intermediate grade carcinoma. You ultimately took her to the OR on 2025 for a lumpectomy, and this procedure went extremely well. All six sampled sentinel/right axillary lymph nodes were uninvolved, and all of the surgical margins were negative. The final, surgical pathologic staging is therefore consistent with [id number] (stage IA) disease. You have now referred her to both ours and [facility] radiation oncology's clinic for further evaluation and adjuvant therapy.    INTERIM HISTORY:  Ms. Acevedo presents to clinic today for initial consultation accompanied by her . They confirm the above history. She reports that, over the course of the ~three weeks since her lumpectomy, she has been recovering uneventfully and doing well. She has no specific complaints in clinic today.    Past Medical History:   Diagnosis Date    Anxiety     Breast cancer     Depression     Disease of thyroid gland     GBS (Guillain Howell syndrome)     pt reports when she was 12 years old    GERD (gastroesophageal reflux disease)      history    Night terrors     Panic disorder     PONV (postoperative nausea and vomiting)     Psychosis     PTSD (post-traumatic stress disorder)     Spinal headache     Thyroid cancer        Past Surgical History:   Procedure Laterality Date    BREAST BIOPSY Left 2008    benign    BREAST LUMPECTOMY WITH SENTINEL NODE BIOPSY Right 7/2/2025    Procedure: BREAST LUMPECTOMY WITH SENTINEL NODE BIOPSY;  Surgeon: Franci Arellano MD;  Location: [facility];  Service: General;  Laterality: Right;    BREAST SURGERY Left     benign tumor removed    COLONOSCOPY      DILATATION AND CURETTAGE      ENDOSCOPY      THYROID CYST EXCISION Left     THYROID LEFT REMOVED       Social History     Socioeconomic History    Marital status:    Tobacco Use    Smoking status: Former     Current packs/day: 1.00     Average packs/day: 1 pack/day for 33.0 years (33.0 ttl pk-yrs)     Types: Cigarettes    Smokeless tobacco: Never    Tobacco comments:     started smoking at 21 years old   Vaping Use    Vaping status: Every Day    Substances: Nicotine, THC   Substance and Sexual Activity    Alcohol use: Yes     Comment: NOT FOR 6 YEARS    Drug use: No    Sexual activity: Yes     Partners: Male       Family History   Problem Relation Age of Onset    Anxiety disorder Mother     Depression Mother     Alcohol abuse Mother     Schizophrenia Maternal Aunt     Breast cancer Neg Hx        Allergies   Allergen Reactions    Other GI Intolerance     Patient states she is allergic to opiates.     Codeine     Darvon [Propoxyphene]     Hydrocodone     Percocet [Oxycodone-Acetaminophen]        Current Outpatient Medications   Medication Sig Dispense Refill    gabapentin (NEURONTIN) 300 MG capsule Take 1 capsule by mouth 2 (Two) Times a Day.      levocetirizine (XYZAL) 5 MG tablet       traZODone (DESYREL) 150 MG tablet Take 1 tablet by mouth Every Night. (Patient taking differently: Take 0.5 tablets by mouth Every Night.) 30 tablet 1    oxyCODONE  "(Roxicodone) 5 MG immediate release tablet Take 1 tablet by mouth Every 4 (Four) Hours As Needed for Severe Pain. (Patient not taking: Reported on 2025) 20 tablet 0    oxyCODONE (Roxicodone) 5 MG immediate release tablet Take 1 tablet by mouth Every 4 (Four) Hours As Needed for Moderate Pain. (Patient not taking: Reported on 2025) 20 tablet 0     No current facility-administered medications for this visit.     REVIEW OF SYSTEMS  CONSTITUTIONAL:  No fever, chills, night sweats or fatigue.  EYES:  No blurry vision, diplopia or other vision changes.  ENT:  No hearing loss, nosebleeds or sore throat.  CARDIOVASCULAR:  No palpitations, arrhythmia, syncopal episodes or edema.  PULMONARY:  No hemoptysis, wheezing, chronic cough or shortness of breath.  BREASTS: As per the HPI above.  GASTROINTESTINAL:  No nausea or vomiting. No constipation or diarrhea. No abdominal pain.  GENITOURINARY:  No hematuria, kidney stones or frequent urination.  MUSCULOSKELETAL:  No joint or back pains.  INTEGUMENTARY: No rashes or pruritus.  ENDOCRINE:  No excessive thirst or hot flashes.  HEMATOLOGIC:  No history of free bleeding, spontaneous bleeding or clotting.  IMMUNOLOGIC:  No allergies or frequent infections.  NEUROLOGIC: No numbness, tingling, seizures or weakness.  PSYCHIATRIC:  No anxiety or depression.    PHYSICAL EXAMINATION  /71   Pulse 73   Temp 97.7 °F (36.5 °C) (Temporal)   Resp 18   Ht 162.6 cm (64.02\")   Wt 52.2 kg (115 lb)   SpO2 99%   BMI 19.73 kg/m²     Pain Score:  Pain Score    25 1218   PainSc: 0-No pain     PHQ-Score Total:  PHQ-9 Total Score:      ECO  GENERAL:  A well-developed, well-nourished, white female in no acute distress.  HEENT:  Pupils equally round and reactive to light.  Extraocular muscles intact.  CARDIOVASCULAR:  Regular rate and rhythm.  No murmurs, gallops or rubs.  LUNGS:  Clear to auscultation bilaterally.  BREASTS: Deferred today. Status post a right-sided lumpectomy " with axillary rosalba evaluation in early July.  ABDOMEN:  Soft, nontender, nondistended with positive bowel sounds.  EXTREMITIES:  No clubbing, cyanosis or edema bilaterally.  SKIN:  No rashes or petechiae.  NEURO:  Cranial nerves grossly intact.  No focal deficits.  PSYCH:  Alert and oriented x3.    LABORATORY  Lab Results   Component Value Date    WBC 5.82 06/30/2025    HGB 13.1 06/30/2025    HCT 40.2 06/30/2025    MCV 93.5 06/30/2025     06/30/2025    NEUTROABS 4.51 02/03/2020       Lab Results   Component Value Date     06/30/2025    K 4.6 06/30/2025     06/30/2025    CO2 25.9 06/30/2025    BUN 9.8 06/30/2025    CREATININE 0.68 06/30/2025    GLUCOSE 92 06/30/2025    CALCIUM 9.4 06/30/2025    AST 17 02/03/2020    ALT 16 02/03/2020    ALKPHOS 56 02/03/2020    BILITOT 0.3 02/03/2020    PROTEINTOT 6.9 02/03/2020    ALBUMIN 4.20 02/03/2020     CBC (06/30/2025): WBCs: 5.82; HgB: 13.1; Hct: 40.2; platelets: 306    IMAGING  Mammogram, diagnostic, bilateral with tomosynthesis (06/05/2025):  1) Benign left mammographic findings.  2) 1.6 cm mass in the right 6:00 region suspicious for malignancy. Bi-Rads Category 4; Suspicious.    PATHOLOGY  Breast, lumpectomy, right (07/02/2025):  Invasive, ductal carcinoma, intermediate grade (3,2,1). Surgical margins free of tumor. Ductal carcinoma in situ, margins free of DCIS. ER strongly positive (100%), OR negative (0%), HER2/rodolfo negative (0+ by IHC). [id number] (stage IA).    [place] node, #1 (07/02/2025): No tumor seen. Three lymph nodes.    [place] node, #2 (07/02/2025): No tumor seen. Three lymph nodes.    IMPRESSION AND PLAN  Ms. Acevedo is a 60 y.o., white female with:  Ductal carcinoma of the right breast: Diagnosed in June 2025 and status post a successful lumpectomy with sentinel lymph node biopsy on 7/02/2025. The surgical margins were all clear, and all six sampled sentinel/right-sided axillary lymph nodes were uninvolved (0/6). The final, surgical  pathology is therefore consistent with stage IA ([id number]), ER strongly positive (100%), AL negative (0%), HER2/rodolfo negative (0+ by IHC), invasive, intermediate grade disease. Over the course of the ~three weeks since her surgery, she has been recovering uneventfully. I had a long discussion with the patient and her  in clinic today regarding this diagnosis and its prognosis. In short, her surgery went extremely well; and her prognosis is already very good. That said, adjuvant therapy is now recommended in order to maximize her chances of cure. As she had a lumpectomy, whole breast radiation is definitely indicated (and she already has an initial consultation with [facility] radiation oncology scheduled early next week). As her tumor is very strongly ER positive, adjuvant endocrine therapy (with an aromatase inhibitor such as Arimidex, given her postmenopausal status) is also definitely indicated. Prior to proceeding with either of these treatment modalities, however, she is agreeable to our sending her lumpectomy specimen(s) off for Oncotype Dx testing, as whether or not chemotherapy will also be recommended as the initial step in her adjuvant treatment plan will depend on these results (and whether or not her tumor is found to be high risk). We will see her back in our clinic in two weeks (on 8/6) if the Oncotype Dx testing results as high risk and adjuvant chemotherapy needs to be arranged. We will see her back in our clinic in two months (~mid to late September) if the Oncotype Dx testing results as low risk, and she is cleared to undergo adjuvant whole breast XRT first. The patient and her  were in agreement with these plans.    It is a pleasure to participate in Ms. Acevedo's care. Please do not hesitate to call with any questions or concerns that you may have.    A total of 60 minutes were spent coordinating this patient’s care in clinic today; more than 50% of this time was face-to-face with the  patient and her , reviewing her medical history, discussing the diagnosis and, in general terms, its prognosis and counseling on the current evaluation, probable adjuvant treatment recommendations and followup plans. All questions were answered to their satisfaction.    FOLLOW UP  Oncotype Dx testing requested today on this month's lumpectomy specimens. With [facility] radiation oncology in initial consultation early next week re: adjuvant whole breast XRT. With [facility] surgery in ongoing postoperative followup, as planned. Return to our clinic in 2 weeks (if adjuvant chemotherapy is indicated based on the Oncotype Dx results) or ~2 months (if it is not).          This document was electronically signed by TERESA Hill MD July 23, 2025 16:28 EDT      CC: MD Joao Burk MD David J. Hays, MD

## 2025-07-28 ENCOUNTER — PATIENT ROUNDING (BHMG ONLY) (OUTPATIENT)
Dept: ONCOLOGY | Facility: CLINIC | Age: 60
End: 2025-07-28
Payer: MEDICAID

## 2025-07-28 ENCOUNTER — CONSULT (OUTPATIENT)
Dept: RADIATION ONCOLOGY | Facility: HOSPITAL | Age: 60
End: 2025-07-28
Payer: MEDICAID

## 2025-07-28 ENCOUNTER — DOCUMENTATION (OUTPATIENT)
Dept: RADIATION ONCOLOGY | Facility: HOSPITAL | Age: 60
End: 2025-07-28
Payer: MEDICAID

## 2025-07-28 VITALS
DIASTOLIC BLOOD PRESSURE: 63 MMHG | OXYGEN SATURATION: 99 % | HEIGHT: 64 IN | RESPIRATION RATE: 16 BRPM | WEIGHT: 114 LBS | TEMPERATURE: 98.7 F | SYSTOLIC BLOOD PRESSURE: 103 MMHG | BODY MASS INDEX: 19.46 KG/M2 | HEART RATE: 70 BPM

## 2025-07-28 DIAGNOSIS — C50.911 INVASIVE DUCTAL CARCINOMA OF RIGHT BREAST: Primary | ICD-10-CM

## 2025-07-28 PROCEDURE — 1126F AMNT PAIN NOTED NONE PRSNT: CPT | Performed by: RADIOLOGY

## 2025-07-28 PROCEDURE — G0463 HOSPITAL OUTPT CLINIC VISIT: HCPCS | Performed by: RADIOLOGY

## 2025-07-28 PROCEDURE — 99205 OFFICE O/P NEW HI 60 MIN: CPT | Performed by: RADIOLOGY

## 2025-07-28 NOTE — PROGRESS NOTES
RN completed radiation education with patient. Patient was provided with an educational folder with all paper documents. RN thoroughly educated patient about the importance of skin care beginning the day of CT Simulation. Patient expressed clear understanding with minimal questions at this time. Consent obtained during today's visit. Patient aware to call with any concerns. Lev provided to patient along with instructions. Pt is awaiting Oncotype Dx results and then will schedule for CT Sim.

## 2025-07-28 NOTE — PROGRESS NOTES
New Patient Office Consult      Patient Name:  Kassandra Acevedo  :                 1965   MRN:                 [id number]  Date of Visit:    2025    Requesting Physician:  Franci Arellano MD     Reason for Referral:  Ductal carcinoma and ductal carcinoma in situ of the right breast status post breast conservation surgery.  Evaluate for postoperative radiotherapy    History of Present Illness:   Mrs.Kimberly Acevedo is a 60 year old white female who was in her usual state of good health until May 2025 when she detected an area of indentation in the skin of the breast just below the areola.  The patient was uncertain how long the skin anomaly had been present.  The patient denied palpation of a breast mass, breast pain, breast skin discoloration, and recent breast trauma.  The patient had under gone excisional biopsy of a benign left breast lesion approximately 15 years ago.    Bilateral mammograms with right breast ultrasound (2025) were compared to prior study of 2021.  The interval development of an approximately 16 mm irregular isodense mass at the right 6:00 region was reported.  Innumerable bilateral scattered and grouped microcalcifications were present in both breasts, and these did not appear to be significantly changed.    Ultrasound directed needle biopsy of the right breast mass (2025) revealed low-grade invasive ductal carcinoma.  The malignancy was strongly positive for estrogen receptors.  Progesterone receptors, and HER2 were negative.    Mrs. Acevedo reviewed the diagnosis of breast cancer and treatment options with Dr. Arellano.  The patient opted for breast conservation surgery.  The patient underwent right breast lumpectomy with sentinel / axillary lymph node biopsies on 2025.  Examination of resected tissue revealed intermediate grade ductal carcinoma measuring 13 x 11 mm, and ductal carcinoma in situ.  Malignancy approach to 0.8 mm from the margin  of the invasive cancer, and to 1 mm from the DCIS margin.  No malignancy was noted in six sentinel and nonsentinel lymph nodes.    Mrs. Acevedo was seen in consultation by Dr. Hill of the medical oncology service on 2025.  Oncotype DX testing was ordered (results pending).    Mrs. Acevedo is referred to our service to review postoperative radiotherapy options for breast cancer.    Subjective      Review of Systems:   Constitutional: Negative for fever, chills, night sweats, fatigue, diminished appetite, and nonintentional weight loss  EENT: Negative  Cardiovascular: Negative  Respiratory: Negative  Gastrointestinal: Negative  Genitourinary: Negative  Integumentary: Negative  Lymphatic: Negative  Endocrine: Negative (history of hypothyroidism)  Immunologic: Negative  Hematopoietic: Negative  Musculoskeletal: Positive for limited range of motion with right arm  Neurologic: Positive for burning sensation in right shoulder/scapular region and numbness in right hand and arm (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)  Psychiatric: Negative    Obstetric-Gynecologic History:  Menarche at age 13 years  Menopause at age 43 years (status post uterine ablation for dysfunctional bleeding)   A1 (spontaneous first trimester miscarriage) C0  First pregnancy at age 31 years  The patient denies use of birth control pills and exogenous hormonal therapy    Past Oncology History:   Oncology/Hematology History   Invasive ductal carcinoma of right breast   2025 Initial Diagnosis    Invasive ductal carcinoma of right breast     2025 Cancer Staged    Staging form: Breast, AJCC 8th Edition  - Pathologic: pT1c, pN0, cM0, ER+, MA-, HER2- - Signed by TERESA Hill MD on 2025        Past Radiation History:  No previous exposures to ionizing radiation therapy and there are not relative contraindications including connective tissue disorder.     Past Medical History:   Past Medical History:   Diagnosis Date    Anxiety      Breast cancer     Depression     Disease of thyroid gland     GBS (Guillain [place] syndrome)     pt reports when she was 12 years old    Night terrors     Panic disorder     PONV (postoperative nausea and vomiting)     Psychosis     PTSD (post-traumatic stress disorder)     Spinal headache     Thyroid cancer        Past Surgical History:   Past Surgical History:   Procedure Laterality Date    BREAST BIOPSY Left 2008    benign    BREAST LUMPECTOMY WITH SENTINEL NODE BIOPSY Right 07/02/2025    Procedure: BREAST LUMPECTOMY WITH SENTINEL NODE BIOPSY;  Surgeon: Franci Arellano MD;  Location: [facility];  Service: General;  Laterality: Right;    BREAST SURGERY Left     benign tumor removed    COLONOSCOPY      D & C HYSTEROSCOPY ENDOMETRIAL ABLATION      ENDOSCOPY      THYROID CYST EXCISION Left     THYROID LEFT REMOVED     Family History:   Family History   Problem Relation Age of Onset    Anxiety disorder Mother     Depression Mother     Alcohol abuse Mother     Lung cancer Mother     Schizophrenia Maternal Aunt     Colon cancer Maternal Aunt     Brain cancer Paternal Grandmother     Breast cancer Neg Hx         Colon cancer                                             Paternal uncle    Social History:   Social History     Socioeconomic History    Marital status:    Tobacco Use    Smoking status: Former     Current packs/day: 1.00     Average packs/day: 1 pack/day for 33.0 years (33.0 ttl pk-yrs)     Types: Cigarettes    Smokeless tobacco: Never    Tobacco comments:     started smoking at 21 years old   Vaping Use    Vaping status: Every Day    Substances: Nicotine, THC   Substance and Sexual Activity    Alcohol use: Yes     Comment: NOT FOR 6 YEARS    Drug use: No    Sexual activity: Yes     Partners: Male     The patient resides at [place]. . Two adult offspring-living and apparently healthy.  Education: High school and vocational school graduate.  No history of  service.  The patient works  part-time as a  at [employer] x 2 years.  Prior occupation was .  The patient smoked 1 pack of cigarettes per day x 35 years.  She quit smoking approximately 1 year ago.  The patient vapes.  Non-drinker.    Medications:     Current Outpatient Medications:     gabapentin (NEURONTIN) 300 MG capsule, Take 1 capsule by mouth 2 (Two) Times a Day., Disp: , Rfl:     levocetirizine (XYZAL) 5 MG tablet, , Disp: , Rfl:     traZODone (DESYREL) 150 MG tablet, Take 1 tablet by mouth Every Night. (Patient taking differently: Take 0.5 tablets by mouth Every Night.), Disp: 30 tablet, Rfl: 1    Allergies:   Allergies   Allergen Reactions    Other GI Intolerance     Patient states she is allergic to opiates.     Codeine     Darvon [Propoxyphene]     Hydrocodone     Percocet [Oxycodone-Acetaminophen]      PHQ-9 Depression Screening  Little interest or pleasure in doing things? Not at all   Feeling down, depressed, or hopeless? Not at all   PHQ-2 Total Score 0   Trouble falling or staying asleep, or sleeping too much?     Feeling tired or having little energy?     Poor appetite or overeating?     Feeling bad about yourself - or that you are a failure or have let yourself or your family down?     Trouble concentrating on things, such as reading the newspaper or watching television?     Moving or speaking so slowly that other people could have noticed? Or the opposite - being so fidgety or restless that you have been moving around a lot more than usual?     Thoughts that you would be better off dead, or of hurting yourself in some way?     PHQ-9 Total Score     If you checked off any problems, how difficult have these problems made it for you to do your work, take care of things at home, or get along with other people? Not difficult at all     Distress Screenin     KPS: 100     Imaging:  US Guided Breast Biopsy With & Without Device initial  Addendum Date: 2025  PATHOLOGY:  Invasive ductal carcinoma,  grade 1.  The pathology result is concordant with the imaging findings.  Recommend surgical consultation.  The patient will be notified of the results and recommendations by our breast care nurse.  This report was finalized on 6/16/2025 3:33 PM by Dr. Sahra Carrington MD.      Mammo Post Device Placement Right  Result Date: 6/12/2025  Status post ultrasound-guided core biopsy right breast. Marking clip is in good position.  This report was finalized on 6/12/2025 12:52 PM by Dr. Sahra Carrington MD.      Mammo Diagnostic Digital Tomosynthesis Bilateral With CAD  Result Date: 6/5/2025   1. Benign left mammographic findings.  2. 1.6 cm mass in the right 6:00 region suspicious for malignancy. Recommend ultrasound-guided core biopsy.   BI-RADS CATEGORY: 4, SUSPICIOUS   RECOMMENDATION: Recommend ultrasound-guided core biopsy of the right breast.  CAD was utilized.  The standard false-negative rate of mammography is between 10% and 25%. Complex patterns or increased breast density will markedly elevate the false-negative rate of mammography.    The patient was notified of the results and recommendations at the time of her visit.  Additionally, a letter, in lay terminology, with the results of this exam will be mailed to the patient.  This report was finalized on 6/5/2025 10:46 AM by Dr. Sahra Carrington MD.      US Breast Right Limited  Result Date: 6/5/2025   1. Benign left mammographic findings.  2. 1.6 cm mass in the right 6:00 region suspicious for malignancy. Recommend ultrasound-guided core biopsy.   BI-RADS CATEGORY: 4, SUSPICIOUS   RECOMMENDATION: Recommend ultrasound-guided core biopsy of the right breast.  CAD was utilized.  The standard false-negative rate of mammography is between 10% and 25%. Complex patterns or increased breast density will markedly elevate the false-negative rate of mammography.    The patient was notified of the results and recommendations at the time of her visit.  Additionally, a letter,  "in lay terminology, with the results of this exam will be mailed to the patient.  This report was finalized on 6/5/2025 10:46 AM by Dr. Sahra Carrington MD.       Pathology:  Described above    Objective     Physical Exam:  The patient is a well-nourished, well-developed, fit appearing middle-aged white female in no acute distress.  Vital signs as below.  .    Vital Signs:   Vitals:    07/28/25 0904   BP: 103/63   Pulse: 70   Resp: 16   Temp: 98.7 °F (37.1 °C)   TempSrc: Temporal   SpO2: 99%   Weight: 51.7 kg (114 lb)   Height: 162.6 cm (64\")   PainSc: 0-No pain     Body mass index is 19.57 kg/m².     Head: Normocephalic, atraumatic.  EENT: Eyes: PERRLA, EOMs intact.  Sclera and conjunctiva clear.  Mouth: Natural upper dentition and lower dental plate.  No intraoral lesions noted.  Ears: TMs visualized and unremarkable.  Neck: Short, supple, trachea midline.  No thyromegaly.  Cervical or periclavicular lymphadenopathy.  No JVD or carotid bruits.  Horizontal scar noted over lower anterior neck  Heart: Regular rate and rhythm  Lungs: Clear to auscultation bilaterally  Breasts: Left breast: Well-healed scar noted at left inframammary fold region.  Right breast: Healed surgical scar noted at right areola and right low axilla.  No masses palpated in either breast.  Lymphatics: No detectable cervical, periclavicular, left axillary or inguinal adenopathy.  Small seroma noted at right axilla.  Abdomen: Soft, nontender, no organomegaly.  Normal bowel sounds present  Integumentary: No suspicious lesions noted on sun exposed skin.  The patient is deeply tanned.  Tattoo at base of spine.  Musculoskeletal: No discomfort elicited on fist percussion of cervical, thoracic or lumbosacral spines.  No costal tenderness.  No swollen or erythematous joints.  Extremities: Symmetric no cyanosis, clubbing or edema  Neurologic: Cranial nerves II through XII grossly intact no motor, sensory, or cerebellar deficit.  Normal " gait.  Psychiatric: Normal mood and affect.  Alert and well oriented x 3.    Assessment / Plan      Assessment:  Intermediate grade ductal carcinoma, and ductal carcinoma in situ of the right breast status post breast conservation surgery with negative sentinel and axillary lymph node biopsies. ICD-10: C50.911    Stage IA (pT1c/Tis pN0 cM0)    Recommendations:  I have reviewed the patient's medical records and imaging studies.  The tumor stage specific treatment guidelines of the National Comprehensive cancer Network, version 2.2025, were consulted.    Mrs. Acevedo was recently seen in consultation by the medical oncology service.  The results of Oncotype Dx testing to predict the likelihood of cancer recurrence and to guide treatment decisions regarding chemotherapy are pending. The patient's breast malignancy was estrogen receptor positive.  I anticipate that the patient will receive aromatase inhibitor endocrine therapy at a later date.    The patient is a candidate for postoperative right breast radiotherapy.  The goals of this treatment are to eradicate any residual malignancy within the breast and to enhance local tumor control.  Whole breast radiotherapy after lumpectomy results in an approximately 50% reduction in ipsilateral breast tumor recurrence.  Postoperative radiotherapy is standard treatment after breast conservation surgery.    I explained to the patient and her  that radiotherapy will consist in the administration of 4256 cGy in 16 treatment fractions to the entire breast.  Whole breast radiotherapy will be followed by a tumor bed boost (1000 cGy in 5 treatment fractions) given the close margins of resection.  Clinical trials have shown similar outcomes, cosmesis, and side effect profiles for conventionally fractionated and moderately hypofractionated radiotherapy.  Mrs. Acevedo will discuss the results of Oncotype DX testing with Dr. Hill in the near future.  I explained to the patient  that radiotherapy would be administered after completion of chemotherapy.    I reviewed the rationale for postoperative breast radiotherapy, duration of treatment (approximately 4 weeks), expected outcomes, possible acute and chronic side effects of radiotherapy, and posttreatment surveillance measures with the patient.  Mrs. Acevedo's many treatment related questions were answered to her satisfaction.  The patient indicated a willingness to proceed with breast radiotherapy at the appropriate time.  We will await the results of Oncotype DX testing and the decisions regarding adjuvant chemotherapy.    Time spent with the patient 60 minutes (the total time included previsit review of medical records and imaging studies, obtaining an independent history of present illness from the patient, performing an appropriate medical examination/evaluation, patient counseling, and coordination of care).    Thank you very much for allowing us to evaluate this very pleasant lady.    Joao Delcid MD   07/28/25 10:29 EDT     cc:    MD REYES Burk MD David Hays, MD

## 2025-08-04 ENCOUNTER — PATIENT ROUNDING (BHMG ONLY) (OUTPATIENT)
Dept: RADIATION ONCOLOGY | Facility: HOSPITAL | Age: 60
End: 2025-08-04
Payer: MEDICAID

## 2025-08-06 LAB — REF LAB TEST METHOD: NORMAL

## 2025-08-12 ENCOUNTER — HOSPITAL ENCOUNTER (OUTPATIENT)
Dept: RADIATION ONCOLOGY | Facility: HOSPITAL | Age: 60
Discharge: HOME | End: 2025-08-12

## 2025-08-25 ENCOUNTER — HOSPITAL ENCOUNTER (OUTPATIENT)
Dept: RADIATION ONCOLOGY | Facility: HOSPITAL | Age: 60
Discharge: HOME | End: 2025-08-25
Payer: MEDICAID

## 2025-08-25 LAB
RAD ONC ARIA COURSE ID: NORMAL
RAD ONC ARIA COURSE INTENT: NORMAL
RAD ONC ARIA COURSE LAST TREATMENT DATE: NORMAL
RAD ONC ARIA COURSE START DATE: NORMAL
RAD ONC ARIA COURSE TREATMENT ELAPSED DAYS: 0
RAD ONC ARIA FIRST TREATMENT DATE: NORMAL
RAD ONC ARIA PLAN FRACTIONS TREATED TO DATE: 1
RAD ONC ARIA PLAN ID: NORMAL
RAD ONC ARIA PLAN PRESCRIBED DOSE PER FRACTION: 2.66 GY
RAD ONC ARIA PLAN PRIMARY REFERENCE POINT: NORMAL
RAD ONC ARIA PLAN TOTAL FRACTIONS PRESCRIBED: 16
RAD ONC ARIA PLAN TOTAL PRESCRIBED DOSE: 4256 CGY
RAD ONC ARIA REFERENCE POINT DOSAGE GIVEN TO DATE: 2.66 GY
RAD ONC ARIA REFERENCE POINT ID: NORMAL
RAD ONC ARIA REFERENCE POINT SESSION DOSAGE GIVEN: 2.66 GY

## 2025-08-26 ENCOUNTER — HOSPITAL ENCOUNTER (OUTPATIENT)
Dept: RADIATION ONCOLOGY | Facility: HOSPITAL | Age: 60
Discharge: HOME | End: 2025-08-26

## 2025-08-26 LAB
RAD ONC ARIA COURSE ID: NORMAL
RAD ONC ARIA COURSE INTENT: NORMAL
RAD ONC ARIA COURSE LAST TREATMENT DATE: NORMAL
RAD ONC ARIA COURSE START DATE: NORMAL
RAD ONC ARIA COURSE TREATMENT ELAPSED DAYS: 1
RAD ONC ARIA FIRST TREATMENT DATE: NORMAL
RAD ONC ARIA PLAN FRACTIONS TREATED TO DATE: 2
RAD ONC ARIA PLAN ID: NORMAL
RAD ONC ARIA PLAN PRESCRIBED DOSE PER FRACTION: 2.66 GY
RAD ONC ARIA PLAN PRIMARY REFERENCE POINT: NORMAL
RAD ONC ARIA PLAN TOTAL FRACTIONS PRESCRIBED: 16
RAD ONC ARIA PLAN TOTAL PRESCRIBED DOSE: 4256 CGY
RAD ONC ARIA REFERENCE POINT DOSAGE GIVEN TO DATE: 5.32 GY
RAD ONC ARIA REFERENCE POINT ID: NORMAL
RAD ONC ARIA REFERENCE POINT SESSION DOSAGE GIVEN: 2.66 GY

## 2025-08-27 ENCOUNTER — HOSPITAL ENCOUNTER (OUTPATIENT)
Dept: RADIATION ONCOLOGY | Facility: HOSPITAL | Age: 60
Discharge: HOME | End: 2025-08-27

## 2025-08-27 VITALS
HEART RATE: 69 BPM | BODY MASS INDEX: 19.57 KG/M2 | DIASTOLIC BLOOD PRESSURE: 79 MMHG | WEIGHT: 114 LBS | RESPIRATION RATE: 16 BRPM | OXYGEN SATURATION: 98 % | SYSTOLIC BLOOD PRESSURE: 129 MMHG | TEMPERATURE: 96.4 F

## 2025-08-27 DIAGNOSIS — C50.911 INVASIVE DUCTAL CARCINOMA OF RIGHT BREAST: Primary | ICD-10-CM

## 2025-08-27 LAB
RAD ONC ARIA COURSE ID: NORMAL
RAD ONC ARIA COURSE INTENT: NORMAL
RAD ONC ARIA COURSE LAST TREATMENT DATE: NORMAL
RAD ONC ARIA COURSE START DATE: NORMAL
RAD ONC ARIA COURSE TREATMENT ELAPSED DAYS: 2
RAD ONC ARIA FIRST TREATMENT DATE: NORMAL
RAD ONC ARIA PLAN FRACTIONS TREATED TO DATE: 3
RAD ONC ARIA PLAN ID: NORMAL
RAD ONC ARIA PLAN PRESCRIBED DOSE PER FRACTION: 2.66 GY
RAD ONC ARIA PLAN PRIMARY REFERENCE POINT: NORMAL
RAD ONC ARIA PLAN TOTAL FRACTIONS PRESCRIBED: 16
RAD ONC ARIA PLAN TOTAL PRESCRIBED DOSE: 4256 CGY
RAD ONC ARIA REFERENCE POINT DOSAGE GIVEN TO DATE: 7.98 GY
RAD ONC ARIA REFERENCE POINT ID: NORMAL
RAD ONC ARIA REFERENCE POINT SESSION DOSAGE GIVEN: 2.66 GY

## 2025-08-28 ENCOUNTER — HOSPITAL ENCOUNTER (OUTPATIENT)
Dept: RADIATION ONCOLOGY | Facility: HOSPITAL | Age: 60
Discharge: HOME | End: 2025-08-28

## 2025-08-28 LAB
RAD ONC ARIA COURSE ID: NORMAL
RAD ONC ARIA COURSE INTENT: NORMAL
RAD ONC ARIA COURSE LAST TREATMENT DATE: NORMAL
RAD ONC ARIA COURSE START DATE: NORMAL
RAD ONC ARIA COURSE TREATMENT ELAPSED DAYS: 3
RAD ONC ARIA FIRST TREATMENT DATE: NORMAL
RAD ONC ARIA PLAN FRACTIONS TREATED TO DATE: 4
RAD ONC ARIA PLAN ID: NORMAL
RAD ONC ARIA PLAN PRESCRIBED DOSE PER FRACTION: 2.66 GY
RAD ONC ARIA PLAN PRIMARY REFERENCE POINT: NORMAL
RAD ONC ARIA PLAN TOTAL FRACTIONS PRESCRIBED: 16
RAD ONC ARIA PLAN TOTAL PRESCRIBED DOSE: 4256 CGY
RAD ONC ARIA REFERENCE POINT DOSAGE GIVEN TO DATE: 10.64 GY
RAD ONC ARIA REFERENCE POINT ID: NORMAL
RAD ONC ARIA REFERENCE POINT SESSION DOSAGE GIVEN: 2.66 GY

## 2025-08-29 ENCOUNTER — HOSPITAL ENCOUNTER (OUTPATIENT)
Dept: RADIATION ONCOLOGY | Facility: HOSPITAL | Age: 60
Discharge: HOME | End: 2025-08-29

## 2025-08-29 LAB
RAD ONC ARIA COURSE ID: NORMAL
RAD ONC ARIA COURSE INTENT: NORMAL
RAD ONC ARIA COURSE LAST TREATMENT DATE: NORMAL
RAD ONC ARIA COURSE START DATE: NORMAL
RAD ONC ARIA COURSE TREATMENT ELAPSED DAYS: 4
RAD ONC ARIA FIRST TREATMENT DATE: NORMAL
RAD ONC ARIA PLAN FRACTIONS TREATED TO DATE: 5
RAD ONC ARIA PLAN ID: NORMAL
RAD ONC ARIA PLAN PRESCRIBED DOSE PER FRACTION: 2.66 GY
RAD ONC ARIA PLAN PRIMARY REFERENCE POINT: NORMAL
RAD ONC ARIA PLAN TOTAL FRACTIONS PRESCRIBED: 16
RAD ONC ARIA PLAN TOTAL PRESCRIBED DOSE: 4256 CGY
RAD ONC ARIA REFERENCE POINT DOSAGE GIVEN TO DATE: 13.3 GY
RAD ONC ARIA REFERENCE POINT ID: NORMAL
RAD ONC ARIA REFERENCE POINT SESSION DOSAGE GIVEN: 2.66 GY

## (undated) DEVICE — SUT MNCRYL 4/0 PS2 18 IN

## (undated) DEVICE — UNDERGLV SURG BIOGEL INDICAT PF 8 GRN

## (undated) DEVICE — HOLDER: Brand: DEROYAL

## (undated) DEVICE — SPNG LAP PREWSH SFTPK 18X18IN STRL PK/5

## (undated) DEVICE — GLV SURG SENSICARE W/ALOE PF LF 7.5 STRL

## (undated) DEVICE — DRSNG PAD ABD 8X10IN STRL

## (undated) DEVICE — Device

## (undated) DEVICE — GLV SURG PREMIERPRO MIC LTX PF SZ8 BRN

## (undated) DEVICE — BNDG ELAS CO-FLEX SLF ADHR 4IN5YD LF STRL

## (undated) DEVICE — SUT SILK 3/0 SH 30IN K832H

## (undated) DEVICE — PENCL SMOKE/EVAC MEGADYNE TELESCP 10FT

## (undated) DEVICE — SUT VIC 3/0 SH 27IN J416H

## (undated) DEVICE — PATIENT RETURN ELECTRODE, SINGLE-USE, CONTACT QUALITY MONITORING, ADULT, WITH 9FT CORD, FOR PATIENTS WEIGING OVER 33LBS. (15KG): Brand: MEGADYNE

## (undated) DEVICE — SUT SILK 2/0 TIES 30IN SA85H

## (undated) DEVICE — PROXIMATE RH ROTATING HEAD SKIN STAPLERS (35 WIDE) CONTAINS 35 STAINLESS STEEL STAPLES: Brand: PROXIMATE

## (undated) DEVICE — PK BASIC 70

## (undated) DEVICE — GOWN,SIRUS,NONRNF,SETINSLV,2XL,18/CS: Brand: MEDLINE

## (undated) DEVICE — STCKNT IMPERV 9X36IN STRL

## (undated) DEVICE — INTENDED FOR TISSUE SEPARATION, AND OTHER PROCEDURES THAT REQUIRE A SHARP SURGICAL BLADE TO PUNCTURE OR CUT.: Brand: BARD-PARKER ® STAINLESS STEEL BLADES

## (undated) DEVICE — KT DRP SPY/PHI W/ICG 25MG STRL

## (undated) DEVICE — DRAPE,T,LAPARO,TRANS,STERILE: Brand: MEDLINE